# Patient Record
Sex: MALE | Race: WHITE | NOT HISPANIC OR LATINO | Employment: FULL TIME | URBAN - METROPOLITAN AREA
[De-identification: names, ages, dates, MRNs, and addresses within clinical notes are randomized per-mention and may not be internally consistent; named-entity substitution may affect disease eponyms.]

---

## 2018-08-15 ENCOUNTER — TRANSCRIBE ORDERS (OUTPATIENT)
Dept: ADMINISTRATIVE | Facility: HOSPITAL | Age: 53
End: 2018-08-15

## 2018-08-15 ENCOUNTER — LAB (OUTPATIENT)
Dept: LAB | Facility: HOSPITAL | Age: 53
End: 2018-08-15

## 2018-08-15 DIAGNOSIS — Z00.00 ROUTINE GENERAL MEDICAL EXAMINATION AT A HEALTH CARE FACILITY: Primary | ICD-10-CM

## 2018-08-15 DIAGNOSIS — Z12.5 SPECIAL SCREENING FOR MALIGNANT NEOPLASM OF PROSTATE: ICD-10-CM

## 2018-08-15 DIAGNOSIS — Z00.00 ROUTINE GENERAL MEDICAL EXAMINATION AT A HEALTH CARE FACILITY: ICD-10-CM

## 2018-08-15 LAB
ALBUMIN SERPL-MCNC: 4.4 G/DL (ref 3.5–5.2)
ALBUMIN/GLOB SERPL: 1.5 G/DL
ALP SERPL-CCNC: 72 U/L (ref 39–117)
ALT SERPL W P-5'-P-CCNC: 18 U/L (ref 1–41)
ANION GAP SERPL CALCULATED.3IONS-SCNC: 11.2 MMOL/L
AST SERPL-CCNC: 16 U/L (ref 1–40)
BASOPHILS # BLD AUTO: 0.04 10*3/MM3 (ref 0–0.2)
BASOPHILS NFR BLD AUTO: 0.7 % (ref 0–1.5)
BILIRUB SERPL-MCNC: 0.2 MG/DL (ref 0.1–1.2)
BILIRUB UR QL STRIP: NEGATIVE
BUN BLD-MCNC: 11 MG/DL (ref 6–20)
BUN/CREAT SERPL: 13.3 (ref 7–25)
CALCIUM SPEC-SCNC: 9.2 MG/DL (ref 8.6–10.5)
CHLORIDE SERPL-SCNC: 103 MMOL/L (ref 98–107)
CHOLEST SERPL-MCNC: 178 MG/DL (ref 0–200)
CLARITY UR: CLEAR
CO2 SERPL-SCNC: 25.8 MMOL/L (ref 22–29)
COLOR UR: YELLOW
CREAT BLD-MCNC: 0.83 MG/DL (ref 0.76–1.27)
DEPRECATED RDW RBC AUTO: 41.4 FL (ref 37–54)
EOSINOPHIL # BLD AUTO: 0.06 10*3/MM3 (ref 0–0.7)
EOSINOPHIL NFR BLD AUTO: 1 % (ref 0.3–6.2)
ERYTHROCYTE [DISTWIDTH] IN BLOOD BY AUTOMATED COUNT: 12.4 % (ref 11.5–14.5)
GFR SERPL CREATININE-BSD FRML MDRD: 97 ML/MIN/1.73
GLOBULIN UR ELPH-MCNC: 3 GM/DL
GLUCOSE BLD-MCNC: 89 MG/DL (ref 65–99)
GLUCOSE UR STRIP-MCNC: NEGATIVE MG/DL
HCT VFR BLD AUTO: 47 % (ref 40.4–52.2)
HDLC SERPL-MCNC: 55 MG/DL (ref 40–60)
HGB BLD-MCNC: 15.2 G/DL (ref 13.7–17.6)
HGB UR QL STRIP.AUTO: NEGATIVE
IMM GRANULOCYTES # BLD: 0.05 10*3/MM3 (ref 0–0.03)
IMM GRANULOCYTES NFR BLD: 0.8 % (ref 0–0.5)
KETONES UR QL STRIP: NEGATIVE
LDLC SERPL CALC-MCNC: 100 MG/DL (ref 0–100)
LDLC/HDLC SERPL: 1.81 {RATIO}
LEUKOCYTE ESTERASE UR QL STRIP.AUTO: NEGATIVE
LYMPHOCYTES # BLD AUTO: 1.99 10*3/MM3 (ref 0.9–4.8)
LYMPHOCYTES NFR BLD AUTO: 32.9 % (ref 19.6–45.3)
MCH RBC QN AUTO: 29.6 PG (ref 27–32.7)
MCHC RBC AUTO-ENTMCNC: 32.3 G/DL (ref 32.6–36.4)
MCV RBC AUTO: 91.4 FL (ref 79.8–96.2)
MONOCYTES # BLD AUTO: 0.51 10*3/MM3 (ref 0.2–1.2)
MONOCYTES NFR BLD AUTO: 8.4 % (ref 5–12)
NEUTROPHILS # BLD AUTO: 3.39 10*3/MM3 (ref 1.9–8.1)
NEUTROPHILS NFR BLD AUTO: 56.2 % (ref 42.7–76)
NITRITE UR QL STRIP: NEGATIVE
PH UR STRIP.AUTO: 6.5 [PH] (ref 5–8)
PLATELET # BLD AUTO: 243 10*3/MM3 (ref 140–500)
PMV BLD AUTO: 10.3 FL (ref 6–12)
POTASSIUM BLD-SCNC: 4.1 MMOL/L (ref 3.5–5.2)
PROT SERPL-MCNC: 7.4 G/DL (ref 6–8.5)
PROT UR QL STRIP: NEGATIVE
PSA SERPL-MCNC: 2.65 NG/ML (ref 0–4)
RBC # BLD AUTO: 5.14 10*6/MM3 (ref 4.6–6)
SODIUM BLD-SCNC: 140 MMOL/L (ref 136–145)
SP GR UR STRIP: <=1.005 (ref 1–1.03)
TRIGL SERPL-MCNC: 117 MG/DL (ref 0–150)
UROBILINOGEN UR QL STRIP: NORMAL
VLDLC SERPL-MCNC: 23.4 MG/DL (ref 5–40)
WBC NRBC COR # BLD: 6.04 10*3/MM3 (ref 4.5–10.7)

## 2018-08-15 PROCEDURE — G0103 PSA SCREENING: HCPCS | Performed by: INTERNAL MEDICINE

## 2018-08-15 PROCEDURE — 36415 COLL VENOUS BLD VENIPUNCTURE: CPT

## 2018-08-15 PROCEDURE — 85025 COMPLETE CBC W/AUTO DIFF WBC: CPT | Performed by: INTERNAL MEDICINE

## 2018-08-15 PROCEDURE — 80061 LIPID PANEL: CPT | Performed by: INTERNAL MEDICINE

## 2018-08-15 PROCEDURE — 80053 COMPREHEN METABOLIC PANEL: CPT | Performed by: INTERNAL MEDICINE

## 2018-08-15 PROCEDURE — 81003 URINALYSIS AUTO W/O SCOPE: CPT | Performed by: INTERNAL MEDICINE

## 2019-08-21 ENCOUNTER — TRANSCRIBE ORDERS (OUTPATIENT)
Dept: ADMINISTRATIVE | Facility: HOSPITAL | Age: 54
End: 2019-08-21

## 2019-08-21 ENCOUNTER — LAB (OUTPATIENT)
Dept: LAB | Facility: HOSPITAL | Age: 54
End: 2019-08-21

## 2019-08-21 DIAGNOSIS — Z12.5 SPECIAL SCREENING FOR MALIGNANT NEOPLASM OF PROSTATE: ICD-10-CM

## 2019-08-21 DIAGNOSIS — Z00.00 ROUTINE GENERAL MEDICAL EXAMINATION AT A HEALTH CARE FACILITY: Primary | ICD-10-CM

## 2019-08-21 DIAGNOSIS — Z00.00 ROUTINE GENERAL MEDICAL EXAMINATION AT A HEALTH CARE FACILITY: ICD-10-CM

## 2019-08-21 LAB
ALBUMIN SERPL-MCNC: 4.8 G/DL (ref 3.5–5.2)
ALBUMIN/GLOB SERPL: 1.7 G/DL
ALP SERPL-CCNC: 61 U/L (ref 39–117)
ALT SERPL W P-5'-P-CCNC: 14 U/L (ref 1–41)
ANION GAP SERPL CALCULATED.3IONS-SCNC: 7.3 MMOL/L (ref 5–15)
AST SERPL-CCNC: 17 U/L (ref 1–40)
BASOPHILS # BLD AUTO: 0.05 10*3/MM3 (ref 0–0.2)
BASOPHILS NFR BLD AUTO: 1.1 % (ref 0–1.5)
BILIRUB SERPL-MCNC: 0.4 MG/DL (ref 0.2–1.2)
BILIRUB UR QL STRIP: NEGATIVE
BUN BLD-MCNC: 13 MG/DL (ref 6–20)
BUN/CREAT SERPL: 12.7 (ref 7–25)
CALCIUM SPEC-SCNC: 10.1 MG/DL (ref 8.6–10.5)
CHLORIDE SERPL-SCNC: 105 MMOL/L (ref 98–107)
CHOLEST SERPL-MCNC: 178 MG/DL (ref 0–200)
CLARITY UR: CLEAR
CO2 SERPL-SCNC: 26.7 MMOL/L (ref 22–29)
COLOR UR: YELLOW
CREAT BLD-MCNC: 1.02 MG/DL (ref 0.76–1.27)
DEPRECATED RDW RBC AUTO: 45.2 FL (ref 37–54)
EOSINOPHIL # BLD AUTO: 0.05 10*3/MM3 (ref 0–0.4)
EOSINOPHIL NFR BLD AUTO: 1.1 % (ref 0.3–6.2)
ERYTHROCYTE [DISTWIDTH] IN BLOOD BY AUTOMATED COUNT: 13.1 % (ref 12.3–15.4)
GFR SERPL CREATININE-BSD FRML MDRD: 76 ML/MIN/1.73
GLOBULIN UR ELPH-MCNC: 2.9 GM/DL
GLUCOSE BLD-MCNC: 93 MG/DL (ref 65–99)
GLUCOSE UR STRIP-MCNC: NEGATIVE MG/DL
HCT VFR BLD AUTO: 46.6 % (ref 37.5–51)
HDLC SERPL-MCNC: 60 MG/DL (ref 40–60)
HGB BLD-MCNC: 14.7 G/DL (ref 13–17.7)
HGB UR QL STRIP.AUTO: NEGATIVE
IMM GRANULOCYTES # BLD AUTO: 0.02 10*3/MM3 (ref 0–0.05)
IMM GRANULOCYTES NFR BLD AUTO: 0.4 % (ref 0–0.5)
KETONES UR QL STRIP: NEGATIVE
LDLC SERPL CALC-MCNC: 97 MG/DL (ref 0–100)
LDLC/HDLC SERPL: 1.61 {RATIO}
LEUKOCYTE ESTERASE UR QL STRIP.AUTO: NEGATIVE
LYMPHOCYTES # BLD AUTO: 1.57 10*3/MM3 (ref 0.7–3.1)
LYMPHOCYTES NFR BLD AUTO: 33.5 % (ref 19.6–45.3)
MCH RBC QN AUTO: 29.8 PG (ref 26.6–33)
MCHC RBC AUTO-ENTMCNC: 31.5 G/DL (ref 31.5–35.7)
MCV RBC AUTO: 94.3 FL (ref 79–97)
MONOCYTES # BLD AUTO: 0.42 10*3/MM3 (ref 0.1–0.9)
MONOCYTES NFR BLD AUTO: 9 % (ref 5–12)
NEUTROPHILS # BLD AUTO: 2.58 10*3/MM3 (ref 1.7–7)
NEUTROPHILS NFR BLD AUTO: 54.9 % (ref 42.7–76)
NITRITE UR QL STRIP: NEGATIVE
NRBC BLD AUTO-RTO: 0 /100 WBC (ref 0–0.2)
PH UR STRIP.AUTO: 5.5 [PH] (ref 5–8)
PLATELET # BLD AUTO: 273 10*3/MM3 (ref 140–450)
PMV BLD AUTO: 9.7 FL (ref 6–12)
POTASSIUM BLD-SCNC: 4.5 MMOL/L (ref 3.5–5.2)
PROT SERPL-MCNC: 7.7 G/DL (ref 6–8.5)
PROT UR QL STRIP: NEGATIVE
PSA SERPL-MCNC: 2.7 NG/ML (ref 0–4)
RBC # BLD AUTO: 4.94 10*6/MM3 (ref 4.14–5.8)
SODIUM BLD-SCNC: 139 MMOL/L (ref 136–145)
SP GR UR STRIP: 1.01 (ref 1–1.03)
TRIGL SERPL-MCNC: 107 MG/DL (ref 0–150)
UROBILINOGEN UR QL STRIP: NORMAL
VLDLC SERPL-MCNC: 21.4 MG/DL (ref 5–40)
WBC NRBC COR # BLD: 4.69 10*3/MM3 (ref 3.4–10.8)

## 2019-08-21 PROCEDURE — 80061 LIPID PANEL: CPT | Performed by: INTERNAL MEDICINE

## 2019-08-21 PROCEDURE — 85025 COMPLETE CBC W/AUTO DIFF WBC: CPT | Performed by: INTERNAL MEDICINE

## 2019-08-21 PROCEDURE — G0103 PSA SCREENING: HCPCS | Performed by: INTERNAL MEDICINE

## 2019-08-21 PROCEDURE — 80053 COMPREHEN METABOLIC PANEL: CPT | Performed by: INTERNAL MEDICINE

## 2019-08-21 PROCEDURE — 81003 URINALYSIS AUTO W/O SCOPE: CPT | Performed by: INTERNAL MEDICINE

## 2019-08-21 PROCEDURE — 36415 COLL VENOUS BLD VENIPUNCTURE: CPT

## 2020-06-24 ENCOUNTER — PREP FOR SURGERY (OUTPATIENT)
Dept: OTHER | Facility: HOSPITAL | Age: 55
End: 2020-06-24

## 2020-06-24 ENCOUNTER — TELEPHONE (OUTPATIENT)
Dept: GASTROENTEROLOGY | Facility: CLINIC | Age: 55
End: 2020-06-24

## 2020-06-24 DIAGNOSIS — Z86.010 PERSONAL HISTORY OF COLONIC POLYPS: ICD-10-CM

## 2020-06-24 DIAGNOSIS — Z12.11 ENCOUNTER FOR SCREENING FOR MALIGNANT NEOPLASM OF COLON: Primary | ICD-10-CM

## 2020-06-24 DIAGNOSIS — Z83.71 FAMILY HISTORY OF COLONIC POLYPS: ICD-10-CM

## 2020-07-02 NOTE — TELEPHONE ENCOUNTER
CALLED AND SPOKE WITH PATIENT.  SCHEDULED AT [place] 08/21/2020 AT 2:45PM - ARRIVE 1:45PM.  E-MAIL INSTRUCTIONS [email or web address] TODAY.

## 2020-07-06 ENCOUNTER — PREP FOR SURGERY (OUTPATIENT)
Dept: OTHER | Facility: HOSPITAL | Age: 55
End: 2020-07-06

## 2020-07-06 DIAGNOSIS — Z12.11 ENCOUNTER FOR SCREENING FOR MALIGNANT NEOPLASM OF COLON: Primary | ICD-10-CM

## 2020-08-18 ENCOUNTER — LAB REQUISITION (OUTPATIENT)
Dept: LAB | Facility: HOSPITAL | Age: 55
End: 2020-08-18

## 2020-08-18 DIAGNOSIS — Z00.00 ENCOUNTER FOR GENERAL ADULT MEDICAL EXAMINATION WITHOUT ABNORMAL FINDINGS: ICD-10-CM

## 2020-08-19 PROCEDURE — U0004 COV-19 TEST NON-CDC HGH THRU: HCPCS | Performed by: INTERNAL MEDICINE

## 2020-08-20 LAB
REF LAB TEST METHOD: NORMAL
SARS-COV-2 RNA RESP QL NAA+PROBE: NOT DETECTED

## 2020-08-21 ENCOUNTER — OUTSIDE FACILITY SERVICE (OUTPATIENT)
Dept: GASTROENTEROLOGY | Facility: CLINIC | Age: 55
End: 2020-08-21

## 2020-08-21 ENCOUNTER — LAB REQUISITION (OUTPATIENT)
Dept: LAB | Facility: HOSPITAL | Age: 55
End: 2020-08-21

## 2020-08-21 DIAGNOSIS — Z12.11 ENCOUNTER FOR SCREENING FOR MALIGNANT NEOPLASM OF COLON: ICD-10-CM

## 2020-08-21 PROCEDURE — 88305 TISSUE EXAM BY PATHOLOGIST: CPT | Performed by: INTERNAL MEDICINE

## 2020-08-21 PROCEDURE — 45380 COLONOSCOPY AND BIOPSY: CPT | Performed by: INTERNAL MEDICINE

## 2020-08-25 LAB
CYTO UR: NORMAL
LAB AP CASE REPORT: NORMAL
LAB AP CLINICAL INFORMATION: NORMAL
PATH REPORT.FINAL DX SPEC: NORMAL
PATH REPORT.GROSS SPEC: NORMAL

## 2020-08-26 ENCOUNTER — LAB (OUTPATIENT)
Dept: LAB | Facility: HOSPITAL | Age: 55
End: 2020-08-26

## 2020-08-26 ENCOUNTER — TRANSCRIBE ORDERS (OUTPATIENT)
Dept: ADMINISTRATIVE | Facility: HOSPITAL | Age: 55
End: 2020-08-26

## 2020-08-26 DIAGNOSIS — Z12.5 SPECIAL SCREENING FOR MALIGNANT NEOPLASM OF PROSTATE: ICD-10-CM

## 2020-08-26 DIAGNOSIS — Z00.00 ROUTINE GENERAL MEDICAL EXAMINATION AT A HEALTH CARE FACILITY: ICD-10-CM

## 2020-08-26 DIAGNOSIS — Z00.00 ROUTINE GENERAL MEDICAL EXAMINATION AT A HEALTH CARE FACILITY: Primary | ICD-10-CM

## 2020-08-26 LAB
ALBUMIN SERPL-MCNC: 4.9 G/DL (ref 3.5–5.2)
ALBUMIN/GLOB SERPL: 1.9 G/DL
ALP SERPL-CCNC: 68 U/L (ref 39–117)
ALT SERPL W P-5'-P-CCNC: 25 U/L (ref 1–41)
ANION GAP SERPL CALCULATED.3IONS-SCNC: 9 MMOL/L (ref 5–15)
AST SERPL-CCNC: 23 U/L (ref 1–40)
BACTERIA UR QL AUTO: ABNORMAL /HPF
BASOPHILS # BLD AUTO: 0.04 10*3/MM3 (ref 0–0.2)
BASOPHILS NFR BLD AUTO: 0.8 % (ref 0–1.5)
BILIRUB SERPL-MCNC: 0.5 MG/DL (ref 0–1.2)
BILIRUB UR QL STRIP: NEGATIVE
BUN SERPL-MCNC: 10 MG/DL (ref 6–20)
BUN/CREAT SERPL: 8.4 (ref 7–25)
CALCIUM SPEC-SCNC: 9.9 MG/DL (ref 8.6–10.5)
CHLORIDE SERPL-SCNC: 102 MMOL/L (ref 98–107)
CHOLEST SERPL-MCNC: 207 MG/DL (ref 0–200)
CLARITY UR: CLEAR
CO2 SERPL-SCNC: 26 MMOL/L (ref 22–29)
COLOR UR: ABNORMAL
CREAT SERPL-MCNC: 1.19 MG/DL (ref 0.76–1.27)
DEPRECATED RDW RBC AUTO: 42.6 FL (ref 37–54)
EOSINOPHIL # BLD AUTO: 0.04 10*3/MM3 (ref 0–0.4)
EOSINOPHIL NFR BLD AUTO: 0.8 % (ref 0.3–6.2)
ERYTHROCYTE [DISTWIDTH] IN BLOOD BY AUTOMATED COUNT: 12.6 % (ref 12.3–15.4)
GFR SERPL CREATININE-BSD FRML MDRD: 63 ML/MIN/1.73
GLOBULIN UR ELPH-MCNC: 2.6 GM/DL
GLUCOSE SERPL-MCNC: 118 MG/DL (ref 65–99)
GLUCOSE UR STRIP-MCNC: NEGATIVE MG/DL
HCT VFR BLD AUTO: 47.1 % (ref 37.5–51)
HDLC SERPL-MCNC: 68 MG/DL (ref 40–60)
HGB BLD-MCNC: 15.9 G/DL (ref 13–17.7)
HGB UR QL STRIP.AUTO: NEGATIVE
HYALINE CASTS UR QL AUTO: ABNORMAL /LPF
IMM GRANULOCYTES # BLD AUTO: 0.02 10*3/MM3 (ref 0–0.05)
IMM GRANULOCYTES NFR BLD AUTO: 0.4 % (ref 0–0.5)
KETONES UR QL STRIP: ABNORMAL
LDLC SERPL CALC-MCNC: 126 MG/DL (ref 0–100)
LDLC/HDLC SERPL: 1.85 {RATIO}
LEUKOCYTE ESTERASE UR QL STRIP.AUTO: NEGATIVE
LYMPHOCYTES # BLD AUTO: 1.62 10*3/MM3 (ref 0.7–3.1)
LYMPHOCYTES NFR BLD AUTO: 30.5 % (ref 19.6–45.3)
MCH RBC QN AUTO: 30.9 PG (ref 26.6–33)
MCHC RBC AUTO-ENTMCNC: 33.8 G/DL (ref 31.5–35.7)
MCV RBC AUTO: 91.6 FL (ref 79–97)
MONOCYTES # BLD AUTO: 0.51 10*3/MM3 (ref 0.1–0.9)
MONOCYTES NFR BLD AUTO: 9.6 % (ref 5–12)
NEUTROPHILS NFR BLD AUTO: 3.09 10*3/MM3 (ref 1.7–7)
NEUTROPHILS NFR BLD AUTO: 57.9 % (ref 42.7–76)
NITRITE UR QL STRIP: NEGATIVE
NRBC BLD AUTO-RTO: 0 /100 WBC (ref 0–0.2)
PH UR STRIP.AUTO: <=5 [PH] (ref 5–8)
PLATELET # BLD AUTO: 278 10*3/MM3 (ref 140–450)
PMV BLD AUTO: 9.7 FL (ref 6–12)
POTASSIUM SERPL-SCNC: 4.9 MMOL/L (ref 3.5–5.2)
PROT SERPL-MCNC: 7.5 G/DL (ref 6–8.5)
PROT UR QL STRIP: ABNORMAL
PSA SERPL-MCNC: 4.89 NG/ML (ref 0–4)
RBC # BLD AUTO: 5.14 10*6/MM3 (ref 4.14–5.8)
RBC # UR: ABNORMAL /HPF
REF LAB TEST METHOD: ABNORMAL
SODIUM SERPL-SCNC: 137 MMOL/L (ref 136–145)
SP GR UR STRIP: 1.02 (ref 1–1.03)
SQUAMOUS #/AREA URNS HPF: ABNORMAL /HPF
TRIGL SERPL-MCNC: 66 MG/DL (ref 0–150)
UROBILINOGEN UR QL STRIP: ABNORMAL
VLDLC SERPL-MCNC: 13.2 MG/DL (ref 5–40)
WBC # BLD AUTO: 5.32 10*3/MM3 (ref 3.4–10.8)
WBC UR QL AUTO: ABNORMAL /HPF

## 2020-08-26 PROCEDURE — 80053 COMPREHEN METABOLIC PANEL: CPT | Performed by: INTERNAL MEDICINE

## 2020-08-26 PROCEDURE — 36415 COLL VENOUS BLD VENIPUNCTURE: CPT

## 2020-08-26 PROCEDURE — 80061 LIPID PANEL: CPT | Performed by: INTERNAL MEDICINE

## 2020-08-26 PROCEDURE — G0103 PSA SCREENING: HCPCS | Performed by: INTERNAL MEDICINE

## 2020-08-26 PROCEDURE — 85025 COMPLETE CBC W/AUTO DIFF WBC: CPT | Performed by: INTERNAL MEDICINE

## 2020-08-26 PROCEDURE — 81001 URINALYSIS AUTO W/SCOPE: CPT | Performed by: INTERNAL MEDICINE

## 2020-08-31 ENCOUNTER — LAB (OUTPATIENT)
Dept: LAB | Facility: HOSPITAL | Age: 55
End: 2020-08-31

## 2020-08-31 ENCOUNTER — TRANSCRIBE ORDERS (OUTPATIENT)
Dept: ADMINISTRATIVE | Facility: HOSPITAL | Age: 55
End: 2020-08-31

## 2020-08-31 DIAGNOSIS — R73.09 OTHER ABNORMAL GLUCOSE: ICD-10-CM

## 2020-08-31 DIAGNOSIS — Z12.5 SPECIAL SCREENING FOR MALIGNANT NEOPLASM OF PROSTATE: ICD-10-CM

## 2020-08-31 DIAGNOSIS — Z12.5 SPECIAL SCREENING FOR MALIGNANT NEOPLASM OF PROSTATE: Primary | ICD-10-CM

## 2020-08-31 LAB — HBA1C MFR BLD: 5.47 % (ref 4.8–5.6)

## 2020-08-31 PROCEDURE — 83036 HEMOGLOBIN GLYCOSYLATED A1C: CPT | Performed by: INTERNAL MEDICINE

## 2020-08-31 PROCEDURE — 84154 ASSAY OF PSA FREE: CPT | Performed by: INTERNAL MEDICINE

## 2020-08-31 PROCEDURE — 36415 COLL VENOUS BLD VENIPUNCTURE: CPT

## 2020-08-31 PROCEDURE — 84153 ASSAY OF PSA TOTAL: CPT | Performed by: INTERNAL MEDICINE

## 2020-09-01 LAB
PSA FREE MFR SERPL: 15 %
PSA FREE SERPL-MCNC: 0.63 NG/ML
PSA SERPL-MCNC: 4.2 NG/ML (ref 0–4)

## 2020-09-11 ENCOUNTER — TRANSCRIBE ORDERS (OUTPATIENT)
Dept: ADMINISTRATIVE | Facility: HOSPITAL | Age: 55
End: 2020-09-11

## 2020-09-11 DIAGNOSIS — R97.20 ELEVATED PSA: Primary | ICD-10-CM

## 2020-09-25 ENCOUNTER — HOSPITAL ENCOUNTER (OUTPATIENT)
Dept: MRI IMAGING | Facility: HOSPITAL | Age: 55
Discharge: HOME | End: 2020-09-25
Admitting: UROLOGY

## 2020-09-25 DIAGNOSIS — R97.20 ELEVATED PSA: ICD-10-CM

## 2020-09-25 PROCEDURE — 0 GADOBENATE DIMEGLUMINE 529 MG/ML SOLUTION: Performed by: UROLOGY

## 2020-09-25 PROCEDURE — A9577 INJ MULTIHANCE: HCPCS | Performed by: UROLOGY

## 2020-09-25 PROCEDURE — 72197 MRI PELVIS W/O & W/DYE: CPT

## 2020-09-25 RX ADMIN — GADOBENATE DIMEGLUMINE 20 ML: 529 INJECTION, SOLUTION INTRAVENOUS at 09:25

## 2021-03-30 ENCOUNTER — BULK ORDERING (OUTPATIENT)
Dept: CASE MANAGEMENT | Facility: OTHER | Age: 56
End: 2021-03-30

## 2021-03-30 DIAGNOSIS — Z23 IMMUNIZATION DUE: ICD-10-CM

## 2021-05-19 ENCOUNTER — HOSPITAL ENCOUNTER (OUTPATIENT)
Dept: PSYCHIATRY | Facility: HOSPITAL | Age: 56
Discharge: HOME | End: 2021-05-19
Admitting: SOCIAL WORKER

## 2021-05-19 PROCEDURE — 90791 PSYCH DIAGNOSTIC EVALUATION: CPT | Performed by: SOCIAL WORKER

## 2021-05-19 NOTE — PROGRESS NOTES
"Pt requesting help to stop drinking ET [place]  Pt oriented X 4 with no AVH nor apparent psychosis. Pt stated he has never had any thoughts of self harming. Pt reports sleep patterns broken as pt waking up during the night and difficult going back to sleep. Appetite reduced.  Pt completed [facility] with this therapist he remained abstinent for 7 years. Pt relapsed 6 months ago and is now drinking to relieve stress and feelings of worthlessness and self loathing. \"I have not been able to stop and I dearly want to\" \"My wife is going to leave me and about to loose fortunes\" Educated pt on withdrawal symptoms and suggested he see his PCP to make sure PCP is informed of pt decision to stop drinking. Pt educated that ETOH withdrawal can be life threatening to seek immediate medical attention if any occur. Pt expressed willingness to do what ever it takes to stop drinking but has \"not been able to stop myself\"  Pt drinking 10-15 beers daily. Pt last drink last night 10 12 oz beers.  Pt will begin [facility] tomorrow at 9 am.   "

## 2021-05-20 ENCOUNTER — OFFICE VISIT (OUTPATIENT)
Dept: PSYCHIATRY | Facility: HOSPITAL | Age: 56
End: 2021-05-20

## 2021-05-20 DIAGNOSIS — F10.20 UNCOMPLICATED ALCOHOL DEPENDENCE (HCC): Primary | ICD-10-CM

## 2021-05-20 PROCEDURE — H0015 ALCOHOL AND/OR DRUG SERVICES: HCPCS | Performed by: SOCIAL WORKER

## 2021-05-20 NOTE — PROGRESS NOTES
"[facility] Group note  Observations:    Engaged in Activity / Process and Self -disclosed: Yes  Applies Topic to self: Yes  Able to give Constructive Feedback: Yes  Affect: anxious  Degree of Insightful Thinking 5  Notes:  7436-3324  Pt assisted with reviewing a chronological of his drinking behaviors and patterns. Pt able to verbalize his awareness of the progression of his alcoholism. Pt has \"no\" BYRON Dsouza, TIAGO            "

## 2021-05-20 NOTE — TREATMENT PLAN
Multi-Disciplinary Problems (from Behavioral Health Treatment Plan)    Active Problems     Problem: Substance Abuse Issues  Start Date: 05/20/21    Problem Details: The patient self-scales this problem as a 10 with 10 being the worst.        Goal Priority Start Date Expected End Date End Date    Patient will abstain from mood altering substances. -- 05/20/21 -- --    Goal Details: Progress toward goal:  Not appropriate to rate progress toward goal since this is the initial treatment plan.        Goal Intervention Frequency Start Date End Date    Provide education to increase patients understanding of addiction and relapse processes. Weekly 05/20/21 --    Intervention Details: Duration of treatment until until discharged.        Goal Priority Start Date Expected End Date End Date    Patient will develop insight into how to improve their relationships. -- 05/20/21 -- --    Goal Details: Progress toward goal:  Not appropriate to rate progress toward goal since this is the initial treatment plan.        Goal Intervention Frequency Start Date End Date    Educate about 12 step recovery programs. Weekly 05/20/21 --    Intervention Details: Duration of treatment until until discharged.        Goal Priority Start Date Expected End Date End Date    Patient will improve positive self regard. -- 05/20/21 -- --    Goal Details: Progress toward goal:  Not appropriate to rate progress toward goal since this is the initial treatment plan.        Goal Intervention Frequency Start Date End Date    Educate patient about how substance use affects their relationships. Weekly 05/20/21 --    Intervention Details: Duration of treatment until until discharged.                           I have discussed and reviewed this treatment plan with the patient.  It has been printed for signatures.

## 2021-05-20 NOTE — PROGRESS NOTES
Teaching Record:  Information / activity:  Roadmap for Recovery - Family Program    Good participation    [id number]      Orquidea Dsouza, CARIW

## 2021-05-20 NOTE — PROGRESS NOTES
IDENTIFYING INFORMATION: The patient is a 56-year-old  white male admitted to the intensive outpatient program with a recent relapse of alcohol use    CHIEF COMPLAINT: I messed up    INFORMANT: Patient    RELIABILITY: Good    HISTORY OF PRESENT ILLNESS: The patient is a 56-year-old white male who reports a recent relapse of alcohol use of 6 weeks duration.  He had been sober for several years previous to that.  He has been in intensive outpatient program at this facility and decided to return given his concerns about his recurrent alcohol use.  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  He reports that he took Wellbutrin at that time but does not recall his response to it.  He is currently on no prescribed psychotropic or other medication.  He denies suicidal or homicidal thinking.  He denies any history of complicated alcohol withdrawal.  He denies abuse of psychoactive substances apart from alcohol.  The patient works in professional insurance.  He lives with his wife.    PAST PSYCHIATRIC HISTORY: As above    PAST MEDICAL HISTORY: Noncontributory    MEDICATIONS: None    ALLERGIES: None    FAMILY HISTORY: Noncontributory    SOCIAL HISTORY: Patient lives with his wife.  He is employed professional insurance.  He reports alcohol use is described previously.  He denies abuse of other psychoactive substances.    MENTAL STATUS EXAM: The patient is a well-developed well-nourished white male appearing his stated age.  He is no apparent physical distress at the time of examination.  He is awake alert and oriented in all spheres.  His mood is euthymic his affect congruent.  Speech is relevant and coherent.  There are no deficits in recognition noted.  Intelligence is judged to be average based on fund of the patient's pharmacotherapy.  He is currently reporting no suicidal or homicidal thinking and denies any psychotic symptoms.  His judgment insight are  intact.    ASSETS/LIABILITIES: High level of functioning, motivation for change/work stressors    DIAGNOSTIC IMPRESSION: Alcohol use disorder    PLAN: At this point we will not initiate any antidepressant or other medication, I have spoken with the patient regarding possible initiation of Campral as possible initiation of Wellbutrin for mood symptoms.  Patient displayed the intensive outpatient program for the next month and same programming of 15 days.

## 2021-05-20 NOTE — PROGRESS NOTES
"[facility] note  Observations:    Engaged in Activity / Process and Self -disclosed: Yes  Applies Topic to self: Yes  Able to give Constructive Feedback: Yes  Affect: anxious  Degree of Insightful Thinking 6  Notes:  3911-4501  Pt assisted with reviewing 15 negative consequences of his return to drinking after years of sobriety. Pt has \"no\" SI  Assisted pt with formulation of and processing weekend sobriety plan.      CARI MckinneyW            "

## 2021-05-24 ENCOUNTER — OFFICE VISIT (OUTPATIENT)
Dept: PSYCHIATRY | Facility: HOSPITAL | Age: 56
End: 2021-05-24

## 2021-05-24 DIAGNOSIS — F10.20 UNCOMPLICATED ALCOHOL DEPENDENCE (HCC): Primary | ICD-10-CM

## 2021-05-24 PROCEDURE — H0015 ALCOHOL AND/OR DRUG SERVICES: HCPCS | Performed by: SOCIAL WORKER

## 2021-05-24 NOTE — PROGRESS NOTES
Teaching Record:  Information / activity:  Triggers and Cravings - Family Program    Good participation   [phone]      Orquidea sDouza, LCSW

## 2021-05-24 NOTE — PROGRESS NOTES
"[facility] Group note  Observations:    Engaged in Activity / Process and Self -disclosed: Yes  Applies Topic to self: Yes  Able to give Constructive Feedback: Yes  Affect: anxious  Degree of Insightful Thinking 5  Notes:  1915-4341  Pt processed cravings for ETOH. Pt assisted in making list of triggers that led to cravings and a coping skill for each listed. Pt has \"no\" SI.      Orquidea Dsouza, LCSW            "

## 2021-05-24 NOTE — PROGRESS NOTES
"[facility] Group note  Observations:    Engaged in Activity / Process and Self -disclosed: Yes  Applies Topic to self: Yes  Able to give Constructive Feedback: Yes  Affect: anxious  Degree of Insightful Thinking 6  Notes:  7919-3178  Pt processed anxiety. Assisted pt to identifying and express feelings connected to work and other stressors. Assisted pt in identifying coping strategies for feelings related to feelings related to stressors Provided support and reinforcement for the use of constructive life skills.  Pt has \"no\" SI.      Orquidea Dsouza, TIAGO            "

## 2021-05-25 ENCOUNTER — OFFICE VISIT (OUTPATIENT)
Dept: PSYCHIATRY | Facility: HOSPITAL | Age: 56
End: 2021-05-25

## 2021-05-25 DIAGNOSIS — F10.20 UNCOMPLICATED ALCOHOL DEPENDENCE (HCC): Primary | ICD-10-CM

## 2021-05-25 PROCEDURE — H0015 ALCOHOL AND/OR DRUG SERVICES: HCPCS | Performed by: SOCIAL WORKER

## 2021-05-25 NOTE — PROGRESS NOTES
"[facility] Group note  Observations:    Engaged in Activity / Process and Self -disclosed: Yes  Applies Topic to self: Yes  Able to give Constructive Feedback: Yes  Affect: anxious  Degree of Insightful Thinking 6  Notes:  0448-4927  Assisted pt with formulation and processing his sobriet plan for today. Pt has \"no\" SI.      Orquidea Dsouza, TIAGO            "

## 2021-05-25 NOTE — PROGRESS NOTES
The patient offers no new complaints when seen today.  He is maintaining sobriety and states that he is benefiting greatly from his participation in the intensive outpatient program.

## 2021-05-25 NOTE — PROGRESS NOTES
Teaching Record:  Information / activity:  Expressive Therapy  6496-7104 Art Therapy - Use of magazine pictures to create a collage of losses and gain associated to recovery, then process in group    Good participation      Pt shared openly about self and described hopes for recovery      Vinod Chawla, LPAT

## 2021-05-25 NOTE — PROGRESS NOTES
"[facility] Group note  Observations:    Engaged in Activity / Process and Self -disclosed: Yes  Applies Topic to self: Yes  Able to give Constructive Feedback: Yes  Affect: sad and anxious  Degree of Insightful Thinking 6  Notes:  5687-8423  Pt processed sadness and anxiety concerning his mothers quality of life as pt mother sleeping 19 hours per dy and has atrophed muscles. \"She is very selfish\" \"She orders my father around and treats him as a slave\" \"I felt good talking to my father who is feeling helpless\" Pt and his sister are going to talk to pt mother and get her to psychiatrist and if necessary in assisted living facility. Pt parents have been  56 years. Pt has \"no\" BYRON Dsouza LCSW            "

## 2021-05-26 ENCOUNTER — OFFICE VISIT (OUTPATIENT)
Dept: PSYCHIATRY | Facility: HOSPITAL | Age: 56
End: 2021-05-26

## 2021-05-26 DIAGNOSIS — F10.20 UNCOMPLICATED ALCOHOL DEPENDENCE (HCC): Primary | ICD-10-CM

## 2021-05-26 NOTE — TREATMENT PLAN
Multi-Disciplinary Problems (from Behavioral Health Treatment Plan)    Active Problems     Problem: Substance Abuse Issues  Start Date: 05/26/21    Problem Details: The patient self-scales this problem as a 10 with 10 being the worst.        Goal Priority Start Date Expected End Date End Date    Patient will abstain from mood altering substances. -- 05/26/21 -- --    Goal Details: Progress toward goal:  Not appropriate to rate progress toward goal since this is the initial treatment plan.        Goal Intervention Frequency Start Date End Date    Provide education to increase patients understanding of addiction and relapse processes. Weekly 05/26/21 --    Intervention Details: Duration of treatment until until discharged.        Goal Priority Start Date Expected End Date End Date    Patient will develop insight into how to improve their relationships. -- 05/26/21 -- --    Goal Details: Progress toward goal:  Not appropriate to rate progress toward goal since this is the initial treatment plan.        Goal Intervention Frequency Start Date End Date    Educate about 12 step recovery programs. Weekly 05/26/21 --    Intervention Details: Duration of treatment until until discharged.        Goal Priority Start Date Expected End Date End Date    Patient will improve positive self regard. -- 05/26/21 -- --    Goal Details: Progress toward goal:  Not appropriate to rate progress toward goal since this is the initial treatment plan.        Goal Intervention Frequency Start Date End Date    Educate patient about how substance use affects their relationships. Weekly 05/26/21 --    Intervention Details: Duration of treatment until until discharged.                           I have discussed and reviewed this treatment plan with the patient.  It has been printed for signatures.

## 2021-05-27 ENCOUNTER — OFFICE VISIT (OUTPATIENT)
Dept: PSYCHIATRY | Facility: HOSPITAL | Age: 56
End: 2021-05-27

## 2021-05-27 DIAGNOSIS — F10.20 UNCOMPLICATED ALCOHOL DEPENDENCE (HCC): Primary | ICD-10-CM

## 2021-05-27 PROCEDURE — H0015 ALCOHOL AND/OR DRUG SERVICES: HCPCS | Performed by: SOCIAL WORKER

## 2021-05-27 NOTE — PROGRESS NOTES
Teaching Record:  Information / activity:  Overview of the 12 Steps    Good participation   [id number]      Orquidea Dsouza, CARIW

## 2021-05-27 NOTE — PROGRESS NOTES
"[facility] IOP Group note  Observations:    Engaged in Activity / Process and Self -disclosed: Yes  Applies Topic to self: Yes  Able to give Constructive Feedback: Yes  Affect: anxious  Degree of Insightful Thinking 6  Notes:  9014-2321  Assisted pt in formulation of and processing his weekend sobriety plan. Pt has \"no\" SI.      Orquidea Dsouza, CARIW            "

## 2021-05-27 NOTE — PROGRESS NOTES
The patient reports that he has been sober for 9 days now.  He is feeling better physically, and voices no new complaints when seen today.

## 2021-05-27 NOTE — PROGRESS NOTES
"[facility] Group note  Observations:    Engaged in Activity / Process and Self -disclosed: Yes  Applies Topic to self: Yes  Able to give Constructive Feedback: Yes  Affect: anxious  Degree of Insightful Thinking 6  Notes:  6824-6945  Pt processed anxiety. Pt processed seeing an individual at AA meeting who could barely function because of the severity of his drinking. \"I thought to myself that was how I was and never to forget that's where I came from. Pt has \"no\" SHAYNA.      Orquidea Dsouza, CARIW            "

## 2021-06-01 ENCOUNTER — OFFICE VISIT (OUTPATIENT)
Dept: PSYCHIATRY | Facility: HOSPITAL | Age: 56
End: 2021-06-01

## 2021-06-01 DIAGNOSIS — F10.20 UNCOMPLICATED ALCOHOL DEPENDENCE (HCC): Primary | ICD-10-CM

## 2021-06-01 PROCEDURE — H0015 ALCOHOL AND/OR DRUG SERVICES: HCPCS | Performed by: SOCIAL WORKER

## 2021-06-01 NOTE — PROGRESS NOTES
"[facility] Group note  Observations:    Engaged in Activity / Process and Self -disclosed: Yes  Applies Topic to self: Yes  Able to give Constructive Feedback: Yes  Affect: anxious  Degree of Insightful Thinking 5  Notes:  1812-5381  Pt processed being on an emotional up and down since his onset of abstinence. Discussed stages of recovery to normalize some of pt anxiety and frustration. This compounds his cancer recovery process. Pt is very committed to his abstinence and involved in AA with sponsor and support network. Pt processed gratitude that the Campral  is helping to reduce his cravings for ETOH. Pt has \"no\" SI.         Orquidea Dsouza, TIAGO            "

## 2021-06-01 NOTE — PROGRESS NOTES
"[facility] Group note  Observations:    Engaged in Activity / Process and Self -disclosed: Yes  Applies Topic to self: Yes  Able to give Constructive Feedback: Yes  Affect: anxious  Degree of Insightful Thinking 6  Notes:  3134-6151  Pt processed anxiety, frustration, and fear. Pt disclosed he has prostrate cancer and has had surgery to treat. Pt will go back for his first follow up post surgery shortly. Pt processed emotional response to coping since he got the diagnosis and throughout his treatment. Pt admits that he was \"On a pity party\" \"If you had cancer you would drink too\" Pt motivated to go on without self pity and has plans to address with his sponsor and continue in recovery from his relapse. Pt has \"no\" SHAYNA Dsouza LCSW            "

## 2021-06-01 NOTE — PROGRESS NOTES
The patient continues to maintain sobriety and reports a very positive response to initiation of acamprosate.  His progress is encouraging.

## 2021-06-01 NOTE — PROGRESS NOTES
Teaching Record:  Information / activity:  Spirituality    Good participation   7740-7358      Orquidea Dsouza, LCSW

## 2021-06-02 ENCOUNTER — OFFICE VISIT (OUTPATIENT)
Dept: PSYCHIATRY | Facility: HOSPITAL | Age: 56
End: 2021-06-02

## 2021-06-02 DIAGNOSIS — F10.20 UNCOMPLICATED ALCOHOL DEPENDENCE (HCC): Primary | ICD-10-CM

## 2021-06-02 PROCEDURE — H0015 ALCOHOL AND/OR DRUG SERVICES: HCPCS | Performed by: SOCIAL WORKER

## 2021-06-02 NOTE — PROGRESS NOTES
"[facility] Group note  Observations:    Engaged in Activity / Process and Self -disclosed: Yes  Applies Topic to self: Yes  Able to give Constructive Feedback: Yes  Affect: anxious  Degree of Insightful Thinking 6  Notes:  9459-0536  Assisted pt with formulation and processing pt sobriety plan for today. Pt has \"no\" SI.      Orquidea Dsouza, CARIW            "

## 2021-06-02 NOTE — PROGRESS NOTES
Teaching Record:  Information / activity:  Expressive Therapy  4766-0529 Art Therapy - use of the therapeutic story [employer] Stream That [employer] and watercolor paint to create image about the story    Good participation    Pt started the sharing of the images and shared of use of support to make life changes      Vinod Chawla, LPAT

## 2021-06-02 NOTE — PROGRESS NOTES
"[facility] note  Observations:    Engaged in Activity / Process and Self -disclosed: Yes  Applies Topic to self: Yes  Able to give Constructive Feedback: Yes  Affect: anxious  Degree of Insightful Thinking 5  Notes:  2719-9768  Pt processed anxiety concerning the living situation and increasing isolation and her expectations that pt father will meet all pt mothers needs making an impossible situation for pt father. \"I had talk with my father and we are going to discuss alternatives with my mother and my sister will help too\" Pt mother spends every day in bed for most all day and has for a long time. Pt processed feeling better that it is finally going to be discussed. Pt has \"no\" BYRON Dsouza LCSW            "

## 2021-06-03 ENCOUNTER — OFFICE VISIT (OUTPATIENT)
Dept: PSYCHIATRY | Facility: HOSPITAL | Age: 56
End: 2021-06-03

## 2021-06-03 DIAGNOSIS — F10.20 UNCOMPLICATED ALCOHOL DEPENDENCE (HCC): Primary | ICD-10-CM

## 2021-06-03 PROCEDURE — H0015 ALCOHOL AND/OR DRUG SERVICES: HCPCS | Performed by: SOCIAL WORKER

## 2021-06-03 NOTE — PROGRESS NOTES
"The patient is maintaining sobriety and reports that he \"feels better than he has in years.\"  He offers no other complaints when seen today.  "

## 2021-06-03 NOTE — PROGRESS NOTES
Teaching Record:  Information / activity:  Growing up in an Alcoholic Family documentary    Good participation   3688-2420      Orquidea Dsouza, LCSW

## 2021-06-03 NOTE — PROGRESS NOTES
"[facility] Group note  Observations:    Engaged in Activity / Process and Self -disclosed: Yes  Applies Topic to self: Yes  Able to give Constructive Feedback: Yes  Affect: anxious  Degree of Insightful Thinking 6  Notes:  1550-7743  Pt processed feeling in a rut. \"I go to work before coming here and I go home, sleep and do it all again.  Encouraged pt to go to more AA meetings, call and work the steps with his AA sponsor, real AA book and The 12 and 12 book. Call support people in recovery at least three daily. Encouraged pt that thes things can help pt greatly. Pt has \"no\" SI.      Orquidea Dsouza, TIAGO            "

## 2021-06-03 NOTE — PROGRESS NOTES
"[facility] Group note  Observations:    Engaged in Activity / Process and Self -disclosed: Yes  Applies Topic to self: Yes  Able to give Constructive Feedback: Yes  Affect: anxious  Degree of Insightful Thinking 6  Notes:  8249-4031  Assisted pt in exploring past emotional issues and relation to present anxiety. Encouraged development of the cognitive and physical response to anxiety. Pt has \"no\" SI      Orquidea Dsouza, LCSSWATI            "

## 2021-06-04 ENCOUNTER — OFFICE VISIT (OUTPATIENT)
Dept: PSYCHIATRY | Facility: HOSPITAL | Age: 56
End: 2021-06-04

## 2021-06-04 DIAGNOSIS — F10.20 UNCOMPLICATED ALCOHOL DEPENDENCE (HCC): Primary | ICD-10-CM

## 2021-06-04 PROCEDURE — H0015 ALCOHOL AND/OR DRUG SERVICES: HCPCS | Performed by: SOCIAL WORKER

## 2021-06-04 NOTE — PROGRESS NOTES
Teaching Record:  Information / activity:  Planning for Sobriety    Good participation   5557-5301      Orquidea Dsouza, LCSW

## 2021-06-04 NOTE — PROGRESS NOTES
"[facility] Group note  Observations: 6471-1059    Engaged in Activity / Process and Self -disclosed: Yes  Applies Topic to self: Yes  Able to give Constructive Feedback: Yes  Affect: anxious  Degree of Insightful Thinking 6  Notes:  Pt processed feeling ok with his wife \"Checking me to see if I have been drinking daily\" \"I understand that I lied to her so many times when I was drunk and would get angry denying that I had been drinking\" \"She does not say anything but I see her checking and smelling and I don't mind\" Pt has No\" SI  Pt processed remorse for his dishonesty in active addiction.    Orquidea Dsouza, LCSW            "

## 2021-06-04 NOTE — PROGRESS NOTES
"[facility] Group note  Observations:    Engaged in Activity / Process and Self -disclosed: Yes  Applies Topic to self: Yes  Able to give Constructive Feedback: Yes  Affect: anxious and bright  Degree of Insightful Thinking 6  Notes:  3822-4582  Assisted pt with formulation and processing his weekend sobriety plan. Pt has \"no\" SI.      Orquidea Dsouza, CARIW            "

## 2021-06-07 ENCOUNTER — OFFICE VISIT (OUTPATIENT)
Dept: PSYCHIATRY | Facility: HOSPITAL | Age: 56
End: 2021-06-07

## 2021-06-07 DIAGNOSIS — F10.20 UNCOMPLICATED ALCOHOL DEPENDENCE (HCC): Primary | ICD-10-CM

## 2021-06-07 PROCEDURE — H0015 ALCOHOL AND/OR DRUG SERVICES: HCPCS | Performed by: SOCIAL WORKER

## 2021-06-07 NOTE — PROGRESS NOTES
"[facility] Group note  Observations:    Engaged in Activity / Process and Self -disclosed: Yes  Applies Topic to self: Yes  Able to give Constructive Feedback: Yes  Affect: anxious  Degree of Insightful Thinking 7  Notes:  7892-1261  Assisted pt with identification of specific triggers to relapse. Assisted pt in identifying and implementing ways to cope with each trigger identified. Pt has \"no\" SI.      Orquidea Dsouza, LCSSWATI            "

## 2021-06-07 NOTE — PROGRESS NOTES
"[facility] Group note  Observations:    Engaged in Activity / Process and Self -disclosed: Yes  Applies Topic to self: Yes  Able to give Constructive Feedback: Yes  Affect: anxious and bright  Degree of Insightful Thinking 6  Notes:  3341-6391      Pt processed feeling emotionally and physically. \"I built a privacy fence over the weekend. Pt did not go to AA meeting. Asked pt if his sponsor has asked pt to go to AA meetings. Inquired of pt if he felt ashamed or other feelings that may keep him from going to AA. Pt stated \"no\" Discussed the value of sober network and working the 12 steps Pt committed to go to meetings. Pt has \"no\" SI. Worked on relapse prevention plan. Discussed that relapse begins long before picking up a drink when stopping the things that were helpful to maintain abstinence.      Orquidea Dsouza, CARIW            "

## 2021-06-07 NOTE — PROGRESS NOTES
Teaching Record:  This entry is an error.  Information / activity:  [facility]  Documentary    Good participation  [phone]      CARI MckinneyW

## 2021-06-07 NOTE — PROGRESS NOTES
Teaching Record:  Information / activity:  [facility]  Documentary    Good participation   [phone]      Orquidea Dsouza, LCSW

## 2021-06-08 ENCOUNTER — OFFICE VISIT (OUTPATIENT)
Dept: PSYCHIATRY | Facility: HOSPITAL | Age: 56
End: 2021-06-08

## 2021-06-08 DIAGNOSIS — F10.20 UNCOMPLICATED ALCOHOL DEPENDENCE (HCC): Primary | ICD-10-CM

## 2021-06-08 PROCEDURE — H0015 ALCOHOL AND/OR DRUG SERVICES: HCPCS | Performed by: SOCIAL WORKER

## 2021-06-08 NOTE — PROGRESS NOTES
"[facility] Group note  Observations:    Engaged in Activity / Process and Self -disclosed: Yes  Applies Topic to self: Yes  Able to give Constructive Feedback: Yes  Affect: anxious  Degree of Insightful Thinking 6  Notes:  2607-2411  Pt processed some depressive symptoms. Pt reports that he and doctor Jonn MORSE psychiatrist, who sees pt twice weekly suggested possibility of prescribing Wellbutrin. \"He felt adding one medication at a time. Dr. Jonn MORSE managing pt MAT as pt taking Acamprosate 3 times per day to reduce cravings pt was experiencing for alcohol. Pt is considering recommendation of Welbutrin. \"I am feeling much better now\" Discussed with pt the return production of neurotransmitters such as dopamine may be elevated at this stage of pt recovery process. Referring to this stage of recovery when the brain had a wake up call to produce neurotransmitters with over production of these neurochemicals. AA refers to this stage as \"Pink cloud\" Occurring prior to the wall when brain is trying to accommodate find the new center chemically.        Orquidea Dsouza, LCSW            "

## 2021-06-08 NOTE — PROGRESS NOTES
The patient is maintaining sobriety and offers no new complaints when seen today.  His progress is encouraging.

## 2021-06-08 NOTE — PROGRESS NOTES
Teaching Record:  Information / activity:  Process Addictions and Stress Management in Recovery    Good participation   8439-4268      Orquidea Dsouza, LCSW

## 2021-06-09 ENCOUNTER — OFFICE VISIT (OUTPATIENT)
Dept: PSYCHIATRY | Facility: HOSPITAL | Age: 56
End: 2021-06-09

## 2021-06-09 DIAGNOSIS — F10.20 UNCOMPLICATED ALCOHOL DEPENDENCE (HCC): Primary | ICD-10-CM

## 2021-06-09 PROCEDURE — H0015 ALCOHOL AND/OR DRUG SERVICES: HCPCS | Performed by: SOCIAL WORKER

## 2021-06-09 NOTE — TREATMENT PLAN
Multi-Disciplinary Problems (from Behavioral Health Treatment Plan)    Active Problems     Not on file                 I have discussed and reviewed this treatment plan with the patient.  It has been printed for signatures.

## 2021-06-09 NOTE — PROGRESS NOTES
"[facility] Group note  Observations:    Engaged in Activity / Process and Self -disclosed: Yes  Applies Topic to self: Yes  Able to give Constructive Feedback: Yes                  5725-6967  Affect: anxious and bright  Degree of Insightful Thinking 6  Notes:  Pt processed frustration with step children   Pt considering initiation of Wellbutrin as recommended. Educated on stages of recovery. Pt processed gratitude that Acamprosate ( Campral) has significantly reduced his cravings for ETOH. Pt encouraged peers in group. Pt reports reconnecting with his family as he became a solitary drinker for the last year not able to be fully present with family activities. During active addiction pt would hide his drinking, be hung over, in a blackout, or craving a drink.  Pt able to verbalize progression of his [facility] over the year and a half he was drinking prior to coming into [facility]. Pt has \"no\" SHAYNA.      Orquidea Dsouza, LCSW            "

## 2021-06-09 NOTE — PROGRESS NOTES
Teaching Record:  Information / activity:  Spirituality and Overview of the 12 Steps    Good participation   [id number]      Orquidea Dsouza, LCSW

## 2021-06-09 NOTE — PLAN OF CARE
"Pt has attended 10 sessions of [facility]. Dr Lunsford is managing pt MAT. Pt prescribed Acamprosate which has significantly reduced his cravings for ETOH. Dr. Lunsford per pt repot suggested Wellbutrin after pt has tolerated Acamprosate well.  Pt has AA sponsor and home group. Pt has resentments of step children who do not work to help support themselves being adults in their 20's. Pt still paying for all their needs as well as housing.  Pt engaged in group process quickly. Pt processing some OCD tendencies an traits. Pt has \"no\" SI.  "

## 2021-06-09 NOTE — PROGRESS NOTES
"[facility] IOP Group note  Observations:    Engaged in Activity / Process and Self -disclosed: Yes  Applies Topic to self: Yes  Able to give Constructive Feedback: Yes  Affect: anxious  Degree of Insightful Thinking 5  Notes:  Pt processed resentment and was able to see how resentments are the number one (according to the book of AA) offender for relapse.   Pt planning on going to in person AA meeting today. Pt has \"no\" SHAYNA.      Orquidea Dsouza, CARIW            "

## 2021-06-10 ENCOUNTER — OFFICE VISIT (OUTPATIENT)
Dept: PSYCHIATRY | Facility: HOSPITAL | Age: 56
End: 2021-06-10

## 2021-06-10 DIAGNOSIS — F33.0 MILD EPISODE OF RECURRENT MAJOR DEPRESSIVE DISORDER (HCC): ICD-10-CM

## 2021-06-10 DIAGNOSIS — F10.20 UNCOMPLICATED ALCOHOL DEPENDENCE (HCC): Primary | ICD-10-CM

## 2021-06-10 PROCEDURE — H0015 ALCOHOL AND/OR DRUG SERVICES: HCPCS | Performed by: SOCIAL WORKER

## 2021-06-10 NOTE — PROGRESS NOTES
The patient is maintaining sobriety with initiation of acamprosate.  He does report that he has had some issues with mild depression and has a history of positive response to Wellbutrin XL.  Accordingly, I will restart this medication at a dose of 150 mg x 1 week then 300 mg.  It is my hope that I will be able to follow up with the patient [facility].

## 2021-06-10 NOTE — PROGRESS NOTES
"[facility] IOP Group note  Observations:   [id number]    Engaged in Activity / Process and Self -disclosed: Yes  Applies Topic to self: Yes  Able to give Constructive Feedback: Yes  Affect: anxious  Degree of Insightful Thinking 6  Notes:  Pt processed guilt and shame concerning incidents in child frias that still botther pr. Pt processed fear of financial ruin. \"I am good financially it is not realistic\" Assisted pt in acknowleing rumination, try to redirect with positive reinforcement of non ritualistic behavior.  Discussed with pt patterns of behaviors focusing on events occurring before rituaistic behavior.  Worked with patient to identify stressors, concern, and anxiety       Orquidea Dsouza LCSW            "

## 2021-06-10 NOTE — PROGRESS NOTES
Teaching Record:  Information / activity:  Expressive Therapy  [id number] Art Therapy - Used markers on paper to create image a bottle outline and symbols of anything had 'bottled in' in the past.    Good participation    Pt shared of multiple losses and trauma and connected the relapse time to time of year of trauma. Supported peers.      Vinod Chawla LPAT

## 2021-06-14 ENCOUNTER — OFFICE VISIT (OUTPATIENT)
Dept: PSYCHIATRY | Facility: HOSPITAL | Age: 56
End: 2021-06-14

## 2021-06-14 DIAGNOSIS — F10.20 UNCOMPLICATED ALCOHOL DEPENDENCE (HCC): Primary | ICD-10-CM

## 2021-06-14 DIAGNOSIS — F33.0 MILD EPISODE OF RECURRENT MAJOR DEPRESSIVE DISORDER (HCC): ICD-10-CM

## 2021-06-14 PROCEDURE — H0015 ALCOHOL AND/OR DRUG SERVICES: HCPCS | Performed by: SOCIAL WORKER

## 2021-06-14 NOTE — PROGRESS NOTES
Teaching Record:  Information / activity:  Alcohol Education and Planning for Sobriety    Good participation [id number]      Orquidea Dsouza, LCSW

## 2021-06-15 ENCOUNTER — OFFICE VISIT (OUTPATIENT)
Dept: PSYCHIATRY | Facility: HOSPITAL | Age: 56
End: 2021-06-15

## 2021-06-15 DIAGNOSIS — F10.20 UNCOMPLICATED ALCOHOL DEPENDENCE (HCC): Primary | ICD-10-CM

## 2021-06-15 PROCEDURE — H0015 ALCOHOL AND/OR DRUG SERVICES: HCPCS | Performed by: SOCIAL WORKER

## 2021-06-15 NOTE — PROGRESS NOTES
"[facility] IOP Group note  Observations:    Engaged in Activity / Process and Self -disclosed: Yes  Applies Topic to self: Yes  Able to give Constructive Feedback: Yes  Affect: anxious  Degree of Insightful Thinking 5  Notes:  [id number]  Pt reports that he is tolerating the Wellbutrin. Pt processing anxiety. Assisted pt in development of reality based cognitive messages that will increase self confidence in handling anxiety. Pt has \"no\" SHAYNA Dsouza, TIAGO            "

## 2021-06-15 NOTE — PROGRESS NOTES
Teaching Record:  Information / activity:  Alcohol Education, Heroin and Opiates Education and Marijuana Education    Good participation   [id number]      CARI MckinneyW

## 2021-06-15 NOTE — PROGRESS NOTES
"[facility] IOP Group note  Observations:    Engaged in Activity / Process and Self -disclosed: Yes  Applies Topic to self: Yes  Able to give Constructive Feedback: Yes  Affect: anxious  Degree of Insightful Thinking 5  Notes:  7736-7058  Assisted pt with addressing shame associated with perceived failure and encourage immediate return to recovery techniques. Pt processed his relapse after 10 years of abstinence working program of AA. Pt has \"no\" BYRON Dsouza LCSW            "

## 2021-06-15 NOTE — PROGRESS NOTES
"[facility] IOP Group note  Observations:    Engaged in Activity / Process and Self -disclosed: Yes  Applies Topic to self: Yes  Able to give Constructive Feedback: Yes  Affect: anxious  Degree of Insightful Thinking 6  Notes:  4481-0263  Pt processed anxiety.Explored with pt past emotional issues and relation to present anxiety. Develop an awareness of the cognitive and physical response to anxiety   Pt able to identify the key past and present life conflicts as pt works toward resolution of unresolved issues. Pt has \"no\" SI.      Orquidea Dsouza, TIAGO            "

## 2021-06-15 NOTE — PROGRESS NOTES
"[facility] IOP Group note  Observations:    Engaged in Activity / Process and Self -disclosed: Yes  Applies Topic to self: Yes  Able to give Constructive Feedback: Yes  Affect: anxious  Degree of Insightful Thinking 5  Notes:  9048-8545  Pt processed anxiety. Assisted pt in developing new ways to set and implement boundaries and limits for self. Provide pt with education on assertiveness. Pt has \"no\" SI.      Orquidea Dsouza, TIAGO            "

## 2021-06-16 ENCOUNTER — OFFICE VISIT (OUTPATIENT)
Dept: PSYCHIATRY | Facility: HOSPITAL | Age: 56
End: 2021-06-16

## 2021-06-16 DIAGNOSIS — F10.20 UNCOMPLICATED ALCOHOL DEPENDENCE (HCC): Primary | ICD-10-CM

## 2021-06-16 DIAGNOSIS — F33.0 MILD EPISODE OF RECURRENT MAJOR DEPRESSIVE DISORDER (HCC): ICD-10-CM

## 2021-06-16 PROCEDURE — H0015 ALCOHOL AND/OR DRUG SERVICES: HCPCS | Performed by: SOCIAL WORKER

## 2021-06-16 NOTE — PROGRESS NOTES
Teaching Record:  Information / activity:  Spirituality    Good participation   [id number]      Orquidea Dsouza, LCSW

## 2021-06-16 NOTE — PROGRESS NOTES
"[facility] IOP Group note  Observations:    Engaged in Activity / Process and Self -disclosed: Yes  Applies Topic to self: Yes  Able to give Constructive Feedback: Yes  Affect: anxious and bright  Degree of Insightful Thinking 5  Notes:  3449-9539  Pt processed anxiety and sadness.\"My wife said to me \"I have done nothing for my recovery\" \"I felt crushed and confused\" \"I come here for 3 hours a day I am not drinking and I am meeting with my AA sponsor tomorrow\" Pt encouraged and discussed how hard trust is to rebuild and offered couples session with this theapist. Pt has \"no\" SI.      Orquidea Dsouza, LCSW            "

## 2021-06-16 NOTE — PROGRESS NOTES
"[facility] IOP Group note  Observations:    Engaged in Activity / Process and Self -disclosed: Yes  Applies Topic to self: Yes  Able to give Constructive Feedback: Yes  Affect: anxious  Degree of Insightful Thinking 5  Notes:  7686-5063  Pt processed anxiety. Pt wife significant trigger. Assisted pt to process how CHASE has impacted relationships. Pt able to verbalize understanding that CHASE is a family disease and how it has damaged his relationship with his wife trust  [place]. Pt educated on recovery stages for family members and the healing process. Pt has \"no\" SI.      Orquidea Dsouza LCSW            "

## 2021-06-17 ENCOUNTER — OFFICE VISIT (OUTPATIENT)
Dept: PSYCHIATRY | Facility: HOSPITAL | Age: 56
End: 2021-06-17

## 2021-06-17 DIAGNOSIS — F10.20 UNCOMPLICATED ALCOHOL DEPENDENCE (HCC): Primary | ICD-10-CM

## 2021-06-17 PROCEDURE — H0015 ALCOHOL AND/OR DRUG SERVICES: HCPCS | Performed by: SOCIAL WORKER

## 2021-06-17 NOTE — PLAN OF CARE
"Pt has attended 16 [facility] sessions. Pt attending AA meetings with his AA sponsor. Pt processing good results taking Acomprosate he is taking to reduce cravings for alcohol.  Dr. Lunsford prescribed Wellbutrin and pt is tolerating well. Pt has \"no\" SI  "

## 2021-06-17 NOTE — PROGRESS NOTES
Teaching Record:  Information / activity:  Planning for Sobriety    Good participation    [id number]      Orquidea Dsouza, LCSW

## 2021-06-17 NOTE — PROGRESS NOTES
"[facility] IOP Group note  Observations:    Engaged in Activity / Process and Self -disclosed: Yes  Applies Topic to self: Yes  Able to give Constructive Feedback: Yes  Affect: anxious  Degree of Insightful Thinking 6  Notes:  Assisted pt with formulation and processing his weekend sobriety plan. Pt has \"no\" BYRON Dsouza LCSW            "

## 2021-06-17 NOTE — PROGRESS NOTES
"[facility] IOP Group note  Observations:    Engaged in Activity / Process and Self -disclosed: Yes  Applies Topic to self: Yes  Able to give Constructive Feedback: Yes  Affect: anxious  Degree of Insightful Thinking 6  Notes:  Pt met with his AA sponsor for lunch yesterday. Pt will begin step work with his AA sponsor. Pt processing sadness concerning his mothers depression. Pt has \"no\" SHAYNA.      Orquidea Dsouza, TIAGO            "

## 2021-06-21 ENCOUNTER — OFFICE VISIT (OUTPATIENT)
Dept: PSYCHIATRY | Facility: HOSPITAL | Age: 56
End: 2021-06-21

## 2021-06-21 DIAGNOSIS — F10.20 UNCOMPLICATED ALCOHOL DEPENDENCE (HCC): Primary | ICD-10-CM

## 2021-06-21 PROCEDURE — H0015 ALCOHOL AND/OR DRUG SERVICES: HCPCS | Performed by: SOCIAL WORKER

## 2021-06-21 NOTE — PROGRESS NOTES
"[facility] IOP Group note  Observations:    Engaged in Activity / Process and Self -disclosed: Yes  Applies Topic to self: Yes  Able to give Constructive Feedback: Yes  Affect: anxious  Degree of Insightful Thinking 6  Notes:  0900  Pt processed anxiety and sadness. Pt work situation is \"Bad, I am worried and am doing all that I can to meet goals and deadlines but seems loosing lovelace\" Pt reports his primary trigger is his work. Pt has craving to use ETOh when work stress increased. Assisted pt with Relapse prevention plan and encouraged his to go to more AA meetings in order to expand his active sober network. Pt has \"no\" BYRON Dsouza LCSW            "

## 2021-06-21 NOTE — PROGRESS NOTES
"[facility] IOP Group note  Observations:    Engaged in Activity / Process and Self -disclosed: Yes  Applies Topic to self: Yes  Able to give Constructive Feedback: Yes  Affect: anxious  Degree of Insightful Thinking 5  Notes:  1540-9030  Pt processed anxiety. Pt encouraged and supported by peers and this therapist. Encouraged pt to talk to his AA sponsor about starting 12 step work process. Pt attending virtual AA meetings. Pt has \"NO\"BYRON Dsouza LCSW            "

## 2021-06-21 NOTE — PROGRESS NOTES
Teaching Record:  Information / activity:  Anonymous People Documentary    Good participation   [id number]      Orquidea Dsouza, CARIW

## 2021-06-22 ENCOUNTER — OFFICE VISIT (OUTPATIENT)
Dept: PSYCHIATRY | Facility: HOSPITAL | Age: 56
End: 2021-06-22

## 2021-06-22 DIAGNOSIS — F10.20 UNCOMPLICATED ALCOHOL DEPENDENCE (HCC): Primary | ICD-10-CM

## 2021-06-22 PROCEDURE — H0015 ALCOHOL AND/OR DRUG SERVICES: HCPCS | Performed by: SOCIAL WORKER

## 2021-06-22 NOTE — PROGRESS NOTES
"[facility] IOP Group note  Observations:    Engaged in Activity / Process and Self -disclosed: Yes  Applies Topic to self: Yes  Able to give Constructive Feedback: Yes  Affect: sad and anxious  Degree of Insightful Thinking 6  Notes:  7282-3824  Pt processed anxiety concerning his work stress and resulting craving for ETOH. Pt processed cravings and received positive feed back with suggestions how to cope with cravings. Pt has \"no\" SI.      Orquidea Dsouza, TIAGO            "

## 2021-06-22 NOTE — PROGRESS NOTES
Teaching Record:  Information / activity:  Overview of the 12 Steps    Good participation  [id number]      Orquidea Dsouza, CARIW

## 2021-06-22 NOTE — PROGRESS NOTES
"[facility] IOP Group note  Observations:    Engaged in Activity / Process and Self -disclosed: Yes  Applies Topic to self: Yes  Able to give Constructive Feedback: Yes  Affect: anxious  Degree of Insightful Thinking 6  Notes:  1078-9407 ADDENDUM  Pt processed gratitude that his lab test results that there is no indication of cancer. Pt processed stressors at home as remodeling has cause pt to live out of suitcase or boxes for access to his clothing and belongings. \"A 6 weeks project has turned in to 9+ and they have not been working for 3 weeks because of materials availability\". Pt has \"no\" SI.      Orquidea Dsouza, CARIW            "

## 2021-06-24 ENCOUNTER — OFFICE VISIT (OUTPATIENT)
Dept: PSYCHIATRY | Facility: HOSPITAL | Age: 56
End: 2021-06-24

## 2021-06-24 DIAGNOSIS — F10.20 UNCOMPLICATED ALCOHOL DEPENDENCE (HCC): Primary | ICD-10-CM

## 2021-06-24 PROCEDURE — H0015 ALCOHOL AND/OR DRUG SERVICES: HCPCS | Performed by: SOCIAL WORKER

## 2021-06-24 NOTE — PROGRESS NOTES
Teaching Record:  Information / activity:  Expressive Therapy  5180-7673 Art Therapy - Use of markers on paper to create image of flower garden representing six aspects of self -POSIES    Good participation    Pt started the sharing of art and processed the metaphors with peers      Vinod Chawla LPAT

## 2021-06-24 NOTE — PROGRESS NOTES
The patient reports that he was sober for 36 days.  He is in bright spirits today and offers no complaints.

## 2021-06-24 NOTE — PROGRESS NOTES
"[facility] IOP Group note  Observations:    Engaged in Activity / Process and Self -disclosed: Yes  Applies Topic to self: Yes  Able to give Constructive Feedback: Yes  Affect: anxious  Degree of Insightful Thinking 6  Notes:  9892-6781  Pt processed anxiety concerning his ability to be intament with his wife subsequent to prostrate surgery. Pt is engaged in group process and encouraging peers in group. Pt has \"no\" SI      Orquidea Dsouza, TIAGO            "

## 2021-06-24 NOTE — PROGRESS NOTES
"[facility] IOP Group note  Observations:    Engaged in Activity / Process and Self -disclosed: Yes  Applies Topic to self: Yes  Able to give Constructive Feedback: Yes  Affect: anxious  Degree of Insightful Thinking 6  Notes:  8657-6889  Assisted pt with formulation of and processing his weekend sobriety plan. Pt has \"no\" SI      Orquidea Dsouza, CARIW            "

## 2021-06-28 ENCOUNTER — OFFICE VISIT (OUTPATIENT)
Dept: PSYCHIATRY | Facility: HOSPITAL | Age: 56
End: 2021-06-28

## 2021-06-28 DIAGNOSIS — F10.20 UNCOMPLICATED ALCOHOL DEPENDENCE (HCC): Primary | ICD-10-CM

## 2021-06-28 PROCEDURE — H0015 ALCOHOL AND/OR DRUG SERVICES: HCPCS | Performed by: SOCIAL WORKER

## 2021-06-28 NOTE — PROGRESS NOTES
Teaching Record:  Information / activity:  Stress Management in Recovery    Good participation    [id number]      Orquidea Dsouza, CARIW

## 2021-06-28 NOTE — PROGRESS NOTES
"[facility] IOP Group note  Observations:    Engaged in Activity / Process and Self -disclosed: Yes  Applies Topic to self: Yes  Able to give Constructive Feedback: Yes  Affect: anxious and bright  Degree of Insightful Thinking 6  Notes:  9976-1366  Pt processed anxiety concerning his father enabling behavior with pt mother. \"I can not fix her behaviors like staying in bed for a month\" \"I have to let my father tell me about it but I know I can not fix when he is catering to\" \"I have tried for years to fix it but I have to let go\" Pt has \"no\" SI.      Orquidea Dsouza, LCSW            "

## 2021-06-28 NOTE — PROGRESS NOTES
"[facility] IOP Group note  Observations:    Engaged in Activity / Process and Self -disclosed: Yes  Applies Topic to self: Yes  Able to give Constructive Feedback: Yes  Affect: anxious  Degree of Insightful Thinking 6  Notes:  4300-0845  Pt processed work stress and fear of financial issues. 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 Pt processed triggers to drink that occurred over the past weekend. Pt went to virtual AA during the weekend.. Pt has \"no\" SHAYNA.      Orquidea Dsouza, TIAGO            "

## 2021-07-01 ENCOUNTER — OFFICE VISIT (OUTPATIENT)
Dept: PSYCHIATRY | Facility: HOSPITAL | Age: 56
End: 2021-07-01

## 2021-07-01 DIAGNOSIS — F10.20 UNCOMPLICATED ALCOHOL DEPENDENCE (HCC): Primary | ICD-10-CM

## 2021-07-01 NOTE — PROGRESS NOTES
Pt checked today in in error  TC to pt expected to return today. Pt stated he was doing well but could not return today.

## 2021-07-06 ENCOUNTER — TELEPHONE (OUTPATIENT)
Dept: PSYCHIATRY | Facility: HOSPITAL | Age: 56
End: 2021-07-06

## 2021-07-06 NOTE — PROGRESS NOTES
Discharge Summary    Kyle attended 21 sessions         Registration Date 5-20-21  Discharge Date  7/6/2021       Summary of Treatment and Progress towards goals:   Pt processed his relapse on ETOH after 10 years of sobriety. Assisted pt with development of and processing relapse prevention plan. Part of this plan was MAT managed by Dr. Jonn MORSE psychiatrist who prescribed pt acamprosate for the high cravings. Pt reported that cravings went down after initiation of Acamprosate. Pt prescribed anti depressant Wellbutrin XL by Dr. Jonn MORSE on 6-10-21.   Pt obtained AA home group and a AA sponsor. Pt attended virtual AA meetings during this episode of treatment. Pt seen twice per week by psychiatrist Dr. Jonn MORSE during treatment.   This therapist knows this pt as he went through [facility] with this therapist 10 years ago.  Diagnostic Impression:   ETOH use disorder  Mild depression    Aftercare Plan:  Pt will attend Continuing Care program for group therapy for 6 months 1.5 hours per week for 6 months    Current Medications:  No current outpatient medications on file.     No current facility-administered medications for this visit.   Wellbutrin XL and Acamprosate    Referrals/ Appointments :   Dr. Lunsford will treat pt on out patient basis [phone],[phone] @ 3 pm        Orquidea Dsouza LCSW

## 2021-07-06 NOTE — TELEPHONE ENCOUNTER
"TC to pt to inform him his insurance co had approved additional 7 sessions of IOP. Furthermore, I let pt know that he had not been back because opf work overload and I had d/c him. Pt wants to come back to [facility] and use the additional sessions.   Pt wants return Thursday.  \"I knew that the insurance co had approved 16 but did not have any other information.\" Pt said  Informed pt that I had faxed request for concurent review and requested more sessions weeks ago but had not heard so I called today to find out what was waht. Shira approved +7 [facility] in addition to the 16 authed upfront.     "

## 2021-09-21 ENCOUNTER — TRANSCRIBE ORDERS (OUTPATIENT)
Dept: ADMINISTRATIVE | Facility: HOSPITAL | Age: 56
End: 2021-09-21

## 2021-09-21 ENCOUNTER — LAB (OUTPATIENT)
Dept: LAB | Facility: HOSPITAL | Age: 56
End: 2021-09-21

## 2021-09-21 DIAGNOSIS — Z12.5 SCREENING FOR PROSTATE CANCER: ICD-10-CM

## 2021-09-21 DIAGNOSIS — Z00.00 ROUTINE GENERAL MEDICAL EXAMINATION AT A HEALTH CARE FACILITY: Primary | ICD-10-CM

## 2021-09-21 DIAGNOSIS — Z00.00 ROUTINE GENERAL MEDICAL EXAMINATION AT A HEALTH CARE FACILITY: ICD-10-CM

## 2021-09-21 LAB
ALBUMIN SERPL-MCNC: 4.9 G/DL (ref 3.5–5.2)
ALBUMIN/GLOB SERPL: 2.6 G/DL
ALP SERPL-CCNC: 80 U/L (ref 39–117)
ALT SERPL W P-5'-P-CCNC: 17 U/L (ref 1–41)
ANION GAP SERPL CALCULATED.3IONS-SCNC: 11.9 MMOL/L (ref 5–15)
AST SERPL-CCNC: 18 U/L (ref 1–40)
BASOPHILS # BLD AUTO: 0.04 10*3/MM3 (ref 0–0.2)
BASOPHILS NFR BLD AUTO: 0.7 % (ref 0–1.5)
BILIRUB SERPL-MCNC: 0.5 MG/DL (ref 0–1.2)
BILIRUB UR QL STRIP: NEGATIVE
BUN SERPL-MCNC: 9 MG/DL (ref 6–20)
BUN/CREAT SERPL: 9.9 (ref 7–25)
CALCIUM SPEC-SCNC: 9.6 MG/DL (ref 8.6–10.5)
CHLORIDE SERPL-SCNC: 104 MMOL/L (ref 98–107)
CHOLEST SERPL-MCNC: 207 MG/DL (ref 0–200)
CLARITY UR: CLEAR
CO2 SERPL-SCNC: 25.1 MMOL/L (ref 22–29)
COLOR UR: YELLOW
CREAT SERPL-MCNC: 0.91 MG/DL (ref 0.76–1.27)
DEPRECATED RDW RBC AUTO: 39.4 FL (ref 37–54)
EOSINOPHIL # BLD AUTO: 0.03 10*3/MM3 (ref 0–0.4)
EOSINOPHIL NFR BLD AUTO: 0.5 % (ref 0.3–6.2)
ERYTHROCYTE [DISTWIDTH] IN BLOOD BY AUTOMATED COUNT: 12.2 % (ref 12.3–15.4)
GFR SERPL CREATININE-BSD FRML MDRD: 86 ML/MIN/1.73
GLOBULIN UR ELPH-MCNC: 1.9 GM/DL
GLUCOSE SERPL-MCNC: 97 MG/DL (ref 65–99)
GLUCOSE UR STRIP-MCNC: NEGATIVE MG/DL
HCT VFR BLD AUTO: 45.1 % (ref 37.5–51)
HDLC SERPL-MCNC: 70 MG/DL (ref 40–60)
HGB BLD-MCNC: 15.1 G/DL (ref 13–17.7)
HGB UR QL STRIP.AUTO: NEGATIVE
IMM GRANULOCYTES # BLD AUTO: 0.02 10*3/MM3 (ref 0–0.05)
IMM GRANULOCYTES NFR BLD AUTO: 0.3 % (ref 0–0.5)
KETONES UR QL STRIP: NEGATIVE
LDLC SERPL CALC-MCNC: 119 MG/DL (ref 0–100)
LDLC/HDLC SERPL: 1.67 {RATIO}
LEUKOCYTE ESTERASE UR QL STRIP.AUTO: NEGATIVE
LYMPHOCYTES # BLD AUTO: 2.13 10*3/MM3 (ref 0.7–3.1)
LYMPHOCYTES NFR BLD AUTO: 36.4 % (ref 19.6–45.3)
MCH RBC QN AUTO: 29.6 PG (ref 26.6–33)
MCHC RBC AUTO-ENTMCNC: 33.5 G/DL (ref 31.5–35.7)
MCV RBC AUTO: 88.4 FL (ref 79–97)
MONOCYTES # BLD AUTO: 0.5 10*3/MM3 (ref 0.1–0.9)
MONOCYTES NFR BLD AUTO: 8.5 % (ref 5–12)
NEUTROPHILS NFR BLD AUTO: 3.13 10*3/MM3 (ref 1.7–7)
NEUTROPHILS NFR BLD AUTO: 53.6 % (ref 42.7–76)
NITRITE UR QL STRIP: NEGATIVE
NRBC BLD AUTO-RTO: 0 /100 WBC (ref 0–0.2)
PH UR STRIP.AUTO: 6 [PH] (ref 5–8)
PLATELET # BLD AUTO: 250 10*3/MM3 (ref 140–450)
PMV BLD AUTO: 9.6 FL (ref 6–12)
POTASSIUM SERPL-SCNC: 4.1 MMOL/L (ref 3.5–5.2)
PROT SERPL-MCNC: 6.8 G/DL (ref 6–8.5)
PROT UR QL STRIP: NEGATIVE
PSA SERPL-MCNC: <0.014 NG/ML (ref 0–4)
RBC # BLD AUTO: 5.1 10*6/MM3 (ref 4.14–5.8)
SODIUM SERPL-SCNC: 141 MMOL/L (ref 136–145)
SP GR UR STRIP: 1.01 (ref 1–1.03)
TRIGL SERPL-MCNC: 100 MG/DL (ref 0–150)
UROBILINOGEN UR QL STRIP: NORMAL
VLDLC SERPL-MCNC: 18 MG/DL (ref 5–40)
WBC # BLD AUTO: 5.85 10*3/MM3 (ref 3.4–10.8)

## 2021-09-21 PROCEDURE — 80061 LIPID PANEL: CPT

## 2021-09-21 PROCEDURE — 81003 URINALYSIS AUTO W/O SCOPE: CPT

## 2021-09-21 PROCEDURE — 80053 COMPREHEN METABOLIC PANEL: CPT

## 2021-09-21 PROCEDURE — 85025 COMPLETE CBC W/AUTO DIFF WBC: CPT

## 2021-09-21 PROCEDURE — G0103 PSA SCREENING: HCPCS

## 2021-09-21 PROCEDURE — 36415 COLL VENOUS BLD VENIPUNCTURE: CPT

## 2023-02-16 ENCOUNTER — TRANSCRIBE ORDERS (OUTPATIENT)
Dept: ADMINISTRATIVE | Facility: HOSPITAL | Age: 58
End: 2023-02-16
Payer: COMMERCIAL

## 2023-02-16 ENCOUNTER — LAB (OUTPATIENT)
Dept: LAB | Facility: HOSPITAL | Age: 58
End: 2023-02-16
Payer: COMMERCIAL

## 2023-02-16 DIAGNOSIS — Z12.5 SPECIAL SCREENING FOR MALIGNANT NEOPLASM OF PROSTATE: ICD-10-CM

## 2023-02-16 DIAGNOSIS — Z00.00 ROUTINE GENERAL MEDICAL EXAMINATION AT A HEALTH CARE FACILITY: Primary | ICD-10-CM

## 2023-02-16 DIAGNOSIS — Z00.00 ROUTINE GENERAL MEDICAL EXAMINATION AT A HEALTH CARE FACILITY: ICD-10-CM

## 2023-02-16 LAB
ALBUMIN SERPL-MCNC: 4.7 G/DL (ref 3.5–5.2)
ALBUMIN/GLOB SERPL: 2 G/DL
ALP SERPL-CCNC: 73 U/L (ref 39–117)
ALT SERPL W P-5'-P-CCNC: 21 U/L (ref 1–41)
ANION GAP SERPL CALCULATED.3IONS-SCNC: 8 MMOL/L (ref 5–15)
AST SERPL-CCNC: 16 U/L (ref 1–40)
BASOPHILS # BLD AUTO: 0.03 10*3/MM3 (ref 0–0.2)
BASOPHILS NFR BLD AUTO: 0.7 % (ref 0–1.5)
BILIRUB SERPL-MCNC: 0.4 MG/DL (ref 0–1.2)
BILIRUB UR QL STRIP: NEGATIVE
BUN SERPL-MCNC: 11 MG/DL (ref 6–20)
BUN/CREAT SERPL: 11.1 (ref 7–25)
CALCIUM SPEC-SCNC: 9.5 MG/DL (ref 8.6–10.5)
CHLORIDE SERPL-SCNC: 103 MMOL/L (ref 98–107)
CHOLEST SERPL-MCNC: 171 MG/DL (ref 0–200)
CLARITY UR: CLEAR
CO2 SERPL-SCNC: 26 MMOL/L (ref 22–29)
COLOR UR: YELLOW
CREAT SERPL-MCNC: 0.99 MG/DL (ref 0.76–1.27)
DEPRECATED RDW RBC AUTO: 37.9 FL (ref 37–54)
EGFRCR SERPLBLD CKD-EPI 2021: 88.9 ML/MIN/1.73
EOSINOPHIL # BLD AUTO: 0.03 10*3/MM3 (ref 0–0.4)
EOSINOPHIL NFR BLD AUTO: 0.7 % (ref 0.3–6.2)
ERYTHROCYTE [DISTWIDTH] IN BLOOD BY AUTOMATED COUNT: 11.8 % (ref 12.3–15.4)
GLOBULIN UR ELPH-MCNC: 2.3 GM/DL
GLUCOSE SERPL-MCNC: 111 MG/DL (ref 65–99)
GLUCOSE UR STRIP-MCNC: NEGATIVE MG/DL
HCT VFR BLD AUTO: 43.3 % (ref 37.5–51)
HDLC SERPL-MCNC: 50 MG/DL (ref 40–60)
HGB BLD-MCNC: 14.3 G/DL (ref 13–17.7)
HGB UR QL STRIP.AUTO: NEGATIVE
IMM GRANULOCYTES # BLD AUTO: 0.02 10*3/MM3 (ref 0–0.05)
IMM GRANULOCYTES NFR BLD AUTO: 0.4 % (ref 0–0.5)
KETONES UR QL STRIP: NEGATIVE
LDLC SERPL CALC-MCNC: 107 MG/DL (ref 0–100)
LDLC/HDLC SERPL: 2.14 {RATIO}
LEUKOCYTE ESTERASE UR QL STRIP.AUTO: NEGATIVE
LYMPHOCYTES # BLD AUTO: 1.58 10*3/MM3 (ref 0.7–3.1)
LYMPHOCYTES NFR BLD AUTO: 35.3 % (ref 19.6–45.3)
MCH RBC QN AUTO: 29.2 PG (ref 26.6–33)
MCHC RBC AUTO-ENTMCNC: 33 G/DL (ref 31.5–35.7)
MCV RBC AUTO: 88.5 FL (ref 79–97)
MONOCYTES # BLD AUTO: 0.41 10*3/MM3 (ref 0.1–0.9)
MONOCYTES NFR BLD AUTO: 9.2 % (ref 5–12)
NEUTROPHILS NFR BLD AUTO: 2.4 10*3/MM3 (ref 1.7–7)
NEUTROPHILS NFR BLD AUTO: 53.7 % (ref 42.7–76)
NITRITE UR QL STRIP: NEGATIVE
NRBC BLD AUTO-RTO: 0 /100 WBC (ref 0–0.2)
PH UR STRIP.AUTO: 6.5 [PH] (ref 5–8)
PLATELET # BLD AUTO: 251 10*3/MM3 (ref 140–450)
PMV BLD AUTO: 9.7 FL (ref 6–12)
POTASSIUM SERPL-SCNC: 4.3 MMOL/L (ref 3.5–5.2)
PROT SERPL-MCNC: 7 G/DL (ref 6–8.5)
PROT UR QL STRIP: NEGATIVE
PSA SERPL-MCNC: <0.014 NG/ML (ref 0–4)
RBC # BLD AUTO: 4.89 10*6/MM3 (ref 4.14–5.8)
SODIUM SERPL-SCNC: 137 MMOL/L (ref 136–145)
SP GR UR STRIP: 1.01 (ref 1–1.03)
TRIGL SERPL-MCNC: 71 MG/DL (ref 0–150)
UROBILINOGEN UR QL STRIP: NORMAL
VLDLC SERPL-MCNC: 14 MG/DL (ref 5–40)
WBC NRBC COR # BLD: 4.47 10*3/MM3 (ref 3.4–10.8)

## 2023-02-16 PROCEDURE — 80053 COMPREHEN METABOLIC PANEL: CPT

## 2023-02-16 PROCEDURE — 80061 LIPID PANEL: CPT

## 2023-02-16 PROCEDURE — G0103 PSA SCREENING: HCPCS

## 2023-02-16 PROCEDURE — 85025 COMPLETE CBC W/AUTO DIFF WBC: CPT

## 2023-02-16 PROCEDURE — 36415 COLL VENOUS BLD VENIPUNCTURE: CPT

## 2023-02-16 PROCEDURE — 81003 URINALYSIS AUTO W/O SCOPE: CPT

## 2023-08-18 ENCOUNTER — TRANSCRIBE ORDERS (OUTPATIENT)
Dept: ADMINISTRATIVE | Facility: HOSPITAL | Age: 58
End: 2023-08-18
Payer: COMMERCIAL

## 2023-08-18 ENCOUNTER — LAB (OUTPATIENT)
Dept: LAB | Facility: HOSPITAL | Age: 58
End: 2023-08-18
Payer: COMMERCIAL

## 2023-08-18 DIAGNOSIS — R73.03 PREDIABETES: ICD-10-CM

## 2023-08-18 DIAGNOSIS — E78.5 HYPERLIPIDEMIA, UNSPECIFIED HYPERLIPIDEMIA TYPE: Primary | ICD-10-CM

## 2023-08-18 DIAGNOSIS — E78.5 HYPERLIPIDEMIA, UNSPECIFIED HYPERLIPIDEMIA TYPE: ICD-10-CM

## 2023-08-18 LAB
CHOLEST SERPL-MCNC: 121 MG/DL (ref 0–200)
HBA1C MFR BLD: 5.9 % (ref 4.8–5.6)
HDLC SERPL-MCNC: 54 MG/DL (ref 40–60)
LDLC SERPL CALC-MCNC: 53 MG/DL (ref 0–100)
LDLC/HDLC SERPL: 1 {RATIO}
TRIGL SERPL-MCNC: 65 MG/DL (ref 0–150)
VLDLC SERPL-MCNC: 14 MG/DL (ref 5–40)

## 2023-08-18 PROCEDURE — 83036 HEMOGLOBIN GLYCOSYLATED A1C: CPT

## 2023-08-18 PROCEDURE — 36415 COLL VENOUS BLD VENIPUNCTURE: CPT

## 2023-08-18 PROCEDURE — 80061 LIPID PANEL: CPT

## 2024-06-27 ENCOUNTER — LAB (OUTPATIENT)
Dept: LAB | Facility: HOSPITAL | Age: 59
End: 2024-06-27
Payer: COMMERCIAL

## 2024-06-27 ENCOUNTER — TRANSCRIBE ORDERS (OUTPATIENT)
Dept: ADMINISTRATIVE | Facility: HOSPITAL | Age: 59
End: 2024-06-27
Payer: COMMERCIAL

## 2024-06-27 DIAGNOSIS — Z85.46 PERSONAL HISTORY OF PROSTATE CANCER: ICD-10-CM

## 2024-06-27 DIAGNOSIS — Z00.00 ROUTINE GENERAL MEDICAL EXAMINATION AT A HEALTH CARE FACILITY: Primary | ICD-10-CM

## 2024-06-27 DIAGNOSIS — R73.03 PREDIABETES: ICD-10-CM

## 2024-06-27 DIAGNOSIS — E78.5 HYPERLIPIDEMIA, UNSPECIFIED HYPERLIPIDEMIA TYPE: ICD-10-CM

## 2024-06-27 DIAGNOSIS — Z00.00 ROUTINE GENERAL MEDICAL EXAMINATION AT A HEALTH CARE FACILITY: ICD-10-CM

## 2024-06-27 LAB
ALBUMIN SERPL-MCNC: 4.6 G/DL (ref 3.5–5.2)
ALBUMIN/GLOB SERPL: 1.8 G/DL
ALP SERPL-CCNC: 81 U/L (ref 39–117)
ALT SERPL W P-5'-P-CCNC: 46 U/L (ref 1–41)
ANION GAP SERPL CALCULATED.3IONS-SCNC: 9 MMOL/L (ref 5–15)
AST SERPL-CCNC: 22 U/L (ref 1–40)
BILIRUB SERPL-MCNC: 0.5 MG/DL (ref 0–1.2)
BILIRUB UR QL STRIP: NEGATIVE
BUN SERPL-MCNC: 11 MG/DL (ref 6–20)
BUN/CREAT SERPL: 11.6 (ref 7–25)
CALCIUM SPEC-SCNC: 9.7 MG/DL (ref 8.6–10.5)
CHLORIDE SERPL-SCNC: 102 MMOL/L (ref 98–107)
CHOLEST SERPL-MCNC: 123 MG/DL (ref 0–200)
CLARITY UR: CLEAR
CO2 SERPL-SCNC: 27 MMOL/L (ref 22–29)
COLOR UR: YELLOW
CREAT SERPL-MCNC: 0.95 MG/DL (ref 0.76–1.27)
EGFRCR SERPLBLD CKD-EPI 2021: 92.2 ML/MIN/1.73
GLOBULIN UR ELPH-MCNC: 2.6 GM/DL
GLUCOSE SERPL-MCNC: 89 MG/DL (ref 65–99)
GLUCOSE UR STRIP-MCNC: NEGATIVE MG/DL
HBA1C MFR BLD: 5.9 % (ref 4.8–5.6)
HDLC SERPL-MCNC: 50 MG/DL (ref 40–60)
HGB UR QL STRIP.AUTO: NEGATIVE
KETONES UR QL STRIP: NEGATIVE
LDLC SERPL CALC-MCNC: 57 MG/DL (ref 0–100)
LDLC/HDLC SERPL: 1.13 {RATIO}
LEUKOCYTE ESTERASE UR QL STRIP.AUTO: NEGATIVE
NITRITE UR QL STRIP: NEGATIVE
PH UR STRIP.AUTO: 6 [PH] (ref 5–8)
POTASSIUM SERPL-SCNC: 4.5 MMOL/L (ref 3.5–5.2)
PROT SERPL-MCNC: 7.2 G/DL (ref 6–8.5)
PROT UR QL STRIP: NEGATIVE
SODIUM SERPL-SCNC: 138 MMOL/L (ref 136–145)
SP GR UR STRIP: 1.01 (ref 1–1.03)
TRIGL SERPL-MCNC: 82 MG/DL (ref 0–150)
UROBILINOGEN UR QL STRIP: NORMAL
VLDLC SERPL-MCNC: 16 MG/DL (ref 5–40)

## 2024-06-27 PROCEDURE — 84153 ASSAY OF PSA TOTAL: CPT

## 2024-06-27 PROCEDURE — 83036 HEMOGLOBIN GLYCOSYLATED A1C: CPT

## 2024-06-27 PROCEDURE — 80053 COMPREHEN METABOLIC PANEL: CPT

## 2024-06-27 PROCEDURE — 36415 COLL VENOUS BLD VENIPUNCTURE: CPT

## 2024-06-27 PROCEDURE — 80061 LIPID PANEL: CPT

## 2024-06-27 PROCEDURE — 81003 URINALYSIS AUTO W/O SCOPE: CPT

## 2024-06-28 LAB — PSA SERPL DL<=0.01 NG/ML-MCNC: <0.006 NG/ML (ref 0–4)

## 2025-05-15 ENCOUNTER — HOSPITAL ENCOUNTER (EMERGENCY)
Facility: HOSPITAL | Age: 60
Discharge: HOME | End: 2025-05-15
Attending: EMERGENCY MEDICINE
Payer: COMMERCIAL

## 2025-05-15 ENCOUNTER — APPOINTMENT (OUTPATIENT)
Dept: GENERAL RADIOLOGY | Facility: HOSPITAL | Age: 60
End: 2025-05-15
Payer: COMMERCIAL

## 2025-05-15 VITALS
HEART RATE: 92 BPM | DIASTOLIC BLOOD PRESSURE: 72 MMHG | TEMPERATURE: 96.3 F | OXYGEN SATURATION: 98 % | RESPIRATION RATE: 18 BRPM | SYSTOLIC BLOOD PRESSURE: 153 MMHG

## 2025-05-15 DIAGNOSIS — F10.929 ALCOHOLIC INTOXICATION WITH COMPLICATION: Primary | ICD-10-CM

## 2025-05-15 PROCEDURE — 99283 EMERGENCY DEPT VISIT LOW MDM: CPT

## 2025-05-15 RX ORDER — SODIUM CHLORIDE 0.9 % (FLUSH) 0.9 %
10 SYRINGE (ML) INJECTION AS NEEDED
Status: DISCONTINUED | OUTPATIENT
Start: 2025-05-15 | End: 2025-05-15 | Stop reason: HOSPADM

## 2025-05-15 RX ORDER — SODIUM CHLORIDE 9 MG/ML
1000 INJECTION, SOLUTION INTRAVENOUS ONCE
Status: DISCONTINUED | OUTPATIENT
Start: 2025-05-15 | End: 2025-05-15

## 2025-05-15 RX ORDER — THIAMINE HYDROCHLORIDE 100 MG/ML
200 INJECTION, SOLUTION INTRAMUSCULAR; INTRAVENOUS ONCE
Status: DISCONTINUED | OUTPATIENT
Start: 2025-05-15 | End: 2025-05-15

## 2025-05-15 NOTE — ED TRIAGE NOTES
Patient to ED via EMS from work. Co-workers called EMS because patient fell at entryway of business. Patient reports he has had alcohol today and has history of alcohol abuse. Patient last alcohol intake while he was on his lunch break.

## 2025-05-15 NOTE — ED PROVIDER NOTES
EMERGENCY DEPARTMENT ENCOUNTER    Room Number:  25/25  PCP: Jack Murillo MD  Independent Historians: Patient and EMS    HPI:  Chief Complaint: had concerns including Alcohol Intoxication.      A complete HPI/ROS/PMH/PSH/SH/FH are unobtainable due to: Uncooperative    Chronic or social conditions impacting patient care (Social Determinants of Health): None      Context: Kyle Ardon is a 60 y.o. male with a medical history of HLD, prostate ca, and alcohol abuse who presents to the ED c/o acute intoxication. Pt was gone to lunch from his office and came back intoxicated falling into the doorway of a different office (not his own) so workers called ems. Pt came to the ER for eval after admitting relapse with etoh today at [facility].  Pt declined ER evaluation and says he can call his wife to take him home to sober.        Review of prior external notes (non-ED) -and- Review of prior external test results outside of this encounter: Pt admitted in 2021 at [facility] for his prostate ca after RALP with Dr. Akers with urology    Prescription drug monitoring program review:     N/A    PAST MEDICAL HISTORY  Active Ambulatory Problems     Diagnosis Date Noted    No Active Ambulatory Problems     Resolved Ambulatory Problems     Diagnosis Date Noted    No Resolved Ambulatory Problems     Past Medical History:   Diagnosis Date    Colon polyp     Elevated PSA 2020    Hyperlipidemia     Prostate cancer          PAST SURGICAL HISTORY  Past Surgical History:   Procedure Laterality Date    COLONOSCOPY  2015    COLONOSCOPY  2020    PROSTATECTOMY  2021         FAMILY HISTORY  Family History   Problem Relation Age of Onset    No Known Problems Mother     Coronary artery disease Father     Kidney failure Father     Prostate cancer Father     No Known Problems Sister          SOCIAL HISTORY  Social History     Socioeconomic History    Marital status:    Tobacco Use    Smoking status: Never    Smokeless tobacco: Never    Substance and Sexual Activity    Alcohol use: Not Currently     Comment: RARE         ALLERGIES  Patient has no known allergies.        REVIEW OF SYSTEMS  Review of Systems  Included in HPI  All systems reviewed and negative except for those discussed in HPI.      PHYSICAL EXAM    I have reviewed the triage vital signs and nursing notes.    ED Triage Vitals   Temp Heart Rate Resp BP SpO2   05/15/25 1336 05/15/25 1332 05/15/25 1332 05/15/25 1332 05/15/25 1332   96.3 °F (35.7 °C) 92 18 153/72 98 %      Temp src Heart Rate Source Patient Position BP Location FiO2 (%)   05/15/25 1336 -- -- -- --   Tympanic           Physical Exam  GENERAL: conversant but intoxicated and has to be redirected several times, initially holding phone upside down but able to order an uber to the correct location, alert, no acute distress, smells of Etoh  SKIN: Warm, dry  HENT: Normocephalic, atraumatic  EYES: no scleral icterus, eomi  RESPIRATORY: relaxed breathing  MUSCULOSKELETAL: no deformity  NEURO: alert, moves all extremities, follows commands                                                                   LAB RESULTS  No results found for this or any previous visit (from the past 24 hours).      RADIOLOGY  No Radiology Exams Resulted Within Past 24 Hours      MEDICATIONS GIVEN IN ER  Medications   sodium chloride 0.9 % flush 10 mL (has no administration in time range)         ORDERS PLACED DURING THIS VISIT:  Orders Placed This Encounter   Procedures    Insert Peripheral IV         OUTPATIENT MEDICATION MANAGEMENT:  Current Facility-Administered Medications Ordered in Epic   Medication Dose Route Frequency Provider Last Rate Last Admin    sodium chloride 0.9 % flush 10 mL  10 mL Intravenous Michaela Farias MD         Current Outpatient Medications Ordered in Epic   Medication Sig Dispense Refill    aspirin 81 MG EC tablet Take 1 tablet by mouth Daily.      rosuvastatin (CRESTOR) 10 MG tablet Take 1 tablet by mouth Every  Night.           PROCEDURES  Procedures            PROGRESS, DATA ANALYSIS, CONSULTS, AND MEDICAL DECISION MAKING  All labs have been independently interpreted by me.  All radiology studies have been reviewed by me. All EKG's have been independently viewed and interpreted by me.  Discussion below represents my analysis of pertinent findings related to patient's condition, differential diagnosis, treatment plan and final disposition.          ED Course as of 05/15/25 1449   Thu May 15, 2025   1412 Pt is refusing any ER evaluation.  He is too intoxicated for discharge although he did book an Uber (despite his phone being upsidedown).  Pt did reluctantly call his wife who will come and pick him up. [AR]   1431 Wife is here in person willing to take patient home.  Discharge information provided for alcohol abuse. [AR]      ED Course User Index  [AR] Michaela Heck MD             AS OF 14:49 EDT VITALS:    BP - 153/72  HR - 92  TEMP - 96.3 °F (35.7 °C) (Tympanic)  O2 SATS - 98%      COMPLEXITY OF CARE  ER eval refused    DIAGNOSIS  Final diagnoses:   Alcoholic intoxication with complication         DISPOSITION  ED Disposition       ED Disposition   Discharge    Condition   Stable    Comment   --                Please note that portions of this document were completed with a voice recognition program.    Note Disclaimer: At [facility], we believe that sharing information builds trust and better relationships. You are receiving this note because you recently visited [facility]. It is possible you will see health information before a provider has talked with you about it. This kind of information can be easy to misunderstand. To help you fully understand what it means for your health, we urge you to discuss this note with your provider.         Michaela Heck MD  05/16/25 3334

## 2025-05-15 NOTE — DISCHARGE INSTRUCTIONS
Seek help at any of the resources listed below for your substance use:  Alcoholics Anonymous Self-Help Group [phone]  Narcotics Anonymous Self-Help Group [phone]  [facility] [phone]  [facility] [phone]  [facility] [phone]  [facility] [phone]  [facility] [phone]  [facility] [phone]  [facility] [phone]  [facility] [phone]    If at any point you have thoughts of harming yourself (thoughts of suicide), then you can call:    National Suicide Prevention Lifeline  The Lifeline provides 24/7, free and confidential support for people in distress as well as prevention and crisis resources for you or your loved ones.    1-849.658.6251

## 2025-06-17 ENCOUNTER — TELEPHONE (OUTPATIENT)
Dept: GASTROENTEROLOGY | Facility: CLINIC | Age: 60
End: 2025-06-17
Payer: COMMERCIAL

## 2025-06-17 NOTE — TELEPHONE ENCOUNTER
Provider: DR ABNER GOODWIN     Caller: JOSIAS MONGE     Relationship to Patient: SELF    Phone Number: [phone]     Reason for Call: PT WOULD LIKE TO SCHEDULE HIS COLONOSCOPY FROM A RECALL DATE OF 8/1/25 PLEASE ADVISE AND CALL PT BACK

## 2025-06-18 DIAGNOSIS — Z86.0101 PERSONAL HISTORY OF ADENOMATOUS AND SERRATED COLON POLYPS: Primary | ICD-10-CM
